# Patient Record
Sex: FEMALE | Race: ASIAN | NOT HISPANIC OR LATINO | URBAN - METROPOLITAN AREA
[De-identification: names, ages, dates, MRNs, and addresses within clinical notes are randomized per-mention and may not be internally consistent; named-entity substitution may affect disease eponyms.]

---

## 2020-04-01 ENCOUNTER — INPATIENT (INPATIENT)
Facility: HOSPITAL | Age: 30
LOS: 4 days | Discharge: HOME | End: 2020-04-06
Attending: INTERNAL MEDICINE | Admitting: INTERNAL MEDICINE
Payer: COMMERCIAL

## 2020-04-01 ENCOUNTER — TRANSCRIPTION ENCOUNTER (OUTPATIENT)
Age: 30
End: 2020-04-01

## 2020-04-01 VITALS
OXYGEN SATURATION: 96 % | TEMPERATURE: 98 F | SYSTOLIC BLOOD PRESSURE: 111 MMHG | RESPIRATION RATE: 18 BRPM | HEART RATE: 109 BPM | DIASTOLIC BLOOD PRESSURE: 72 MMHG

## 2020-04-01 DIAGNOSIS — J18.9 PNEUMONIA, UNSPECIFIED ORGANISM: ICD-10-CM

## 2020-04-01 DIAGNOSIS — E11.10 TYPE 2 DIABETES MELLITUS WITH KETOACIDOSIS WITHOUT COMA: ICD-10-CM

## 2020-04-01 DIAGNOSIS — R09.02 HYPOXEMIA: ICD-10-CM

## 2020-04-01 DIAGNOSIS — B34.2 CORONAVIRUS INFECTION, UNSPECIFIED: ICD-10-CM

## 2020-04-01 LAB
ALBUMIN SERPL ELPH-MCNC: 3.2 G/DL — LOW (ref 3.3–5)
ALP SERPL-CCNC: 61 U/L — SIGNIFICANT CHANGE UP (ref 40–120)
ALT FLD-CCNC: 8 U/L — SIGNIFICANT CHANGE UP (ref 4–33)
ANION GAP SERPL CALC-SCNC: 13 MMO/L — SIGNIFICANT CHANGE UP (ref 7–14)
ANION GAP SERPL CALC-SCNC: 16 MMO/L — HIGH (ref 7–14)
APTT BLD: 31.1 SEC — SIGNIFICANT CHANGE UP (ref 27.5–36.3)
AST SERPL-CCNC: 18 U/L — SIGNIFICANT CHANGE UP (ref 4–32)
B-OH-BUTYR SERPL-SCNC: 1 MMOL/L — HIGH (ref 0–0.4)
B-OH-BUTYR SERPL-SCNC: 3.1 MMOL/L — HIGH (ref 0–0.4)
BASE EXCESS BLDV CALC-SCNC: -0.4 MMOL/L — SIGNIFICANT CHANGE UP
BASE EXCESS BLDV CALC-SCNC: 3.4 MMOL/L — SIGNIFICANT CHANGE UP
BASOPHILS # BLD AUTO: 0.02 K/UL — SIGNIFICANT CHANGE UP (ref 0–0.2)
BASOPHILS NFR BLD AUTO: 0.2 % — SIGNIFICANT CHANGE UP (ref 0–2)
BASOPHILS NFR SPEC: 0 % — SIGNIFICANT CHANGE UP (ref 0–2)
BILIRUB SERPL-MCNC: 0.5 MG/DL — SIGNIFICANT CHANGE UP (ref 0.2–1.2)
BLASTS # FLD: 0 % — SIGNIFICANT CHANGE UP (ref 0–0)
BLOOD GAS VENOUS - CREATININE: 0.63 MG/DL — SIGNIFICANT CHANGE UP (ref 0.5–1.3)
BUN SERPL-MCNC: 11 MG/DL — SIGNIFICANT CHANGE UP (ref 7–23)
BUN SERPL-MCNC: 13 MG/DL — SIGNIFICANT CHANGE UP (ref 7–23)
CALCIUM SERPL-MCNC: 8.4 MG/DL — SIGNIFICANT CHANGE UP (ref 8.4–10.5)
CALCIUM SERPL-MCNC: 8.8 MG/DL — SIGNIFICANT CHANGE UP (ref 8.4–10.5)
CHLORIDE BLDV-SCNC: 98 MMOL/L — SIGNIFICANT CHANGE UP (ref 96–108)
CHLORIDE SERPL-SCNC: 92 MMOL/L — LOW (ref 98–107)
CHLORIDE SERPL-SCNC: 95 MMOL/L — LOW (ref 98–107)
CO2 SERPL-SCNC: 21 MMOL/L — LOW (ref 22–31)
CO2 SERPL-SCNC: 24 MMOL/L — SIGNIFICANT CHANGE UP (ref 22–31)
CREAT SERPL-MCNC: 0.55 MG/DL — SIGNIFICANT CHANGE UP (ref 0.5–1.3)
CREAT SERPL-MCNC: 0.64 MG/DL — SIGNIFICANT CHANGE UP (ref 0.5–1.3)
CRP SERPL-MCNC: 181.7 MG/L — HIGH
EOSINOPHIL # BLD AUTO: 0 K/UL — SIGNIFICANT CHANGE UP (ref 0–0.5)
EOSINOPHIL NFR BLD AUTO: 0 % — SIGNIFICANT CHANGE UP (ref 0–6)
EOSINOPHIL NFR FLD: 0 % — SIGNIFICANT CHANGE UP (ref 0–6)
GAS PNL BLDV: 131 MMOL/L — LOW (ref 136–146)
GAS PNL BLDV: 132 MMOL/L — LOW (ref 136–146)
GIANT PLATELETS BLD QL SMEAR: PRESENT — SIGNIFICANT CHANGE UP
GLUCOSE BLDV-MCNC: 235 MG/DL — HIGH (ref 70–99)
GLUCOSE BLDV-MCNC: 372 MG/DL — HIGH (ref 70–99)
GLUCOSE SERPL-MCNC: 234 MG/DL — HIGH (ref 70–99)
GLUCOSE SERPL-MCNC: 396 MG/DL — HIGH (ref 70–99)
HBA1C BLD-MCNC: 12.2 % — HIGH (ref 4–5.6)
HCG SERPL-ACNC: < 5 MIU/ML — SIGNIFICANT CHANGE UP
HCO3 BLDV-SCNC: 23 MMOL/L — SIGNIFICANT CHANGE UP (ref 20–27)
HCO3 BLDV-SCNC: 26 MMOL/L — SIGNIFICANT CHANGE UP (ref 20–27)
HCT VFR BLD CALC: 37.5 % — SIGNIFICANT CHANGE UP (ref 34.5–45)
HCT VFR BLDV CALC: 37.1 % — SIGNIFICANT CHANGE UP (ref 34.5–45)
HCT VFR BLDV CALC: 38.7 % — SIGNIFICANT CHANGE UP (ref 34.5–45)
HGB BLD-MCNC: 12.3 G/DL — SIGNIFICANT CHANGE UP (ref 11.5–15.5)
HGB BLDV-MCNC: 12 G/DL — SIGNIFICANT CHANGE UP (ref 11.5–15.5)
HGB BLDV-MCNC: 12.6 G/DL — SIGNIFICANT CHANGE UP (ref 11.5–15.5)
IMM GRANULOCYTES NFR BLD AUTO: 0.9 % — SIGNIFICANT CHANGE UP (ref 0–1.5)
INR BLD: 1.23 — HIGH (ref 0.88–1.17)
LACTATE BLDV-MCNC: 2.6 MMOL/L — HIGH (ref 0.5–2)
LIDOCAIN IGE QN: 16.5 U/L — SIGNIFICANT CHANGE UP (ref 7–60)
LYMPHOCYTES # BLD AUTO: 1.18 K/UL — SIGNIFICANT CHANGE UP (ref 1–3.3)
LYMPHOCYTES # BLD AUTO: 13.2 % — SIGNIFICANT CHANGE UP (ref 13–44)
LYMPHOCYTES NFR SPEC AUTO: 7.3 % — LOW (ref 13–44)
MACROCYTES BLD QL: SLIGHT — SIGNIFICANT CHANGE UP
MCHC RBC-ENTMCNC: 28.5 PG — SIGNIFICANT CHANGE UP (ref 27–34)
MCHC RBC-ENTMCNC: 32.8 % — SIGNIFICANT CHANGE UP (ref 32–36)
MCV RBC AUTO: 86.8 FL — SIGNIFICANT CHANGE UP (ref 80–100)
METAMYELOCYTES # FLD: 0.9 % — SIGNIFICANT CHANGE UP (ref 0–1)
MICROCYTES BLD QL: SLIGHT — SIGNIFICANT CHANGE UP
MONOCYTES # BLD AUTO: 0.49 K/UL — SIGNIFICANT CHANGE UP (ref 0–0.9)
MONOCYTES NFR BLD AUTO: 5.5 % — SIGNIFICANT CHANGE UP (ref 2–14)
MONOCYTES NFR BLD: 7.3 % — SIGNIFICANT CHANGE UP (ref 2–9)
MYELOCYTES NFR BLD: 0 % — SIGNIFICANT CHANGE UP (ref 0–0)
NEUTROPHIL AB SER-ACNC: 69.1 % — SIGNIFICANT CHANGE UP (ref 43–77)
NEUTROPHILS # BLD AUTO: 7.17 K/UL — SIGNIFICANT CHANGE UP (ref 1.8–7.4)
NEUTROPHILS NFR BLD AUTO: 80.2 % — HIGH (ref 43–77)
NEUTS BAND # BLD: 11.8 % — HIGH (ref 0–6)
NRBC # FLD: 0 K/UL — SIGNIFICANT CHANGE UP (ref 0–0)
OTHER - HEMATOLOGY %: 0 — SIGNIFICANT CHANGE UP
PCO2 BLDV: 47 MMHG — SIGNIFICANT CHANGE UP (ref 41–51)
PCO2 BLDV: 49 MMHG — SIGNIFICANT CHANGE UP (ref 41–51)
PH BLDV: 7.34 PH — SIGNIFICANT CHANGE UP (ref 7.32–7.43)
PH BLDV: 7.38 PH — SIGNIFICANT CHANGE UP (ref 7.32–7.43)
PLATELET # BLD AUTO: 193 K/UL — SIGNIFICANT CHANGE UP (ref 150–400)
PLATELET COUNT - ESTIMATE: NORMAL — SIGNIFICANT CHANGE UP
PMV BLD: 11.4 FL — SIGNIFICANT CHANGE UP (ref 7–13)
PO2 BLDV: 25 MMHG — LOW (ref 35–40)
PO2 BLDV: 26 MMHG — LOW (ref 35–40)
POLYCHROMASIA BLD QL SMEAR: SLIGHT — SIGNIFICANT CHANGE UP
POTASSIUM BLDV-SCNC: 3.5 MMOL/L — SIGNIFICANT CHANGE UP (ref 3.4–4.5)
POTASSIUM BLDV-SCNC: 4.3 MMOL/L — SIGNIFICANT CHANGE UP (ref 3.4–4.5)
POTASSIUM SERPL-MCNC: 3.6 MMOL/L — SIGNIFICANT CHANGE UP (ref 3.5–5.3)
POTASSIUM SERPL-MCNC: 4.2 MMOL/L — SIGNIFICANT CHANGE UP (ref 3.5–5.3)
POTASSIUM SERPL-SCNC: 3.6 MMOL/L — SIGNIFICANT CHANGE UP (ref 3.5–5.3)
POTASSIUM SERPL-SCNC: 4.2 MMOL/L — SIGNIFICANT CHANGE UP (ref 3.5–5.3)
PROMYELOCYTES # FLD: 0 % — SIGNIFICANT CHANGE UP (ref 0–0)
PROT SERPL-MCNC: 7.6 G/DL — SIGNIFICANT CHANGE UP (ref 6–8.3)
PROTHROM AB SERPL-ACNC: 14.1 SEC — HIGH (ref 9.8–13.1)
RBC # BLD: 4.32 M/UL — SIGNIFICANT CHANGE UP (ref 3.8–5.2)
RBC # FLD: 11.4 % — SIGNIFICANT CHANGE UP (ref 10.3–14.5)
REVIEW TO FOLLOW: YES — SIGNIFICANT CHANGE UP
SAO2 % BLDV: 41.4 % — LOW (ref 60–85)
SAO2 % BLDV: 43.3 % — LOW (ref 60–85)
SARS-COV-2 RNA SPEC QL NAA+PROBE: DETECTED
SODIUM SERPL-SCNC: 129 MMOL/L — LOW (ref 135–145)
SODIUM SERPL-SCNC: 132 MMOL/L — LOW (ref 135–145)
VARIANT LYMPHS # BLD: 3.6 % — SIGNIFICANT CHANGE UP
WBC # BLD: 8.94 K/UL — SIGNIFICANT CHANGE UP (ref 3.8–10.5)
WBC # FLD AUTO: 8.94 K/UL — SIGNIFICANT CHANGE UP (ref 3.8–10.5)

## 2020-04-01 PROCEDURE — 71045 X-RAY EXAM CHEST 1 VIEW: CPT | Mod: 26

## 2020-04-01 RX ORDER — DEXTROSE 50 % IN WATER 50 %
25 SYRINGE (ML) INTRAVENOUS ONCE
Refills: 0 | Status: DISCONTINUED | OUTPATIENT
Start: 2020-04-01 | End: 2020-04-06

## 2020-04-01 RX ORDER — SODIUM CHLORIDE 9 MG/ML
1000 INJECTION INTRAMUSCULAR; INTRAVENOUS; SUBCUTANEOUS ONCE
Refills: 0 | Status: COMPLETED | OUTPATIENT
Start: 2020-04-01 | End: 2020-04-01

## 2020-04-01 RX ORDER — GLUCAGON INJECTION, SOLUTION 0.5 MG/.1ML
1 INJECTION, SOLUTION SUBCUTANEOUS ONCE
Refills: 0 | Status: DISCONTINUED | OUTPATIENT
Start: 2020-04-01 | End: 2020-04-06

## 2020-04-01 RX ORDER — INSULIN LISPRO 100/ML
VIAL (ML) SUBCUTANEOUS
Refills: 0 | Status: DISCONTINUED | OUTPATIENT
Start: 2020-04-01 | End: 2020-04-02

## 2020-04-01 RX ORDER — DEXTROSE 50 % IN WATER 50 %
15 SYRINGE (ML) INTRAVENOUS ONCE
Refills: 0 | Status: DISCONTINUED | OUTPATIENT
Start: 2020-04-01 | End: 2020-04-06

## 2020-04-01 RX ORDER — ACETAMINOPHEN 500 MG
650 TABLET ORAL EVERY 4 HOURS
Refills: 0 | Status: DISCONTINUED | OUTPATIENT
Start: 2020-04-01 | End: 2020-04-02

## 2020-04-01 RX ORDER — HEPARIN SODIUM 5000 [USP'U]/ML
5000 INJECTION INTRAVENOUS; SUBCUTANEOUS EVERY 8 HOURS
Refills: 0 | Status: DISCONTINUED | OUTPATIENT
Start: 2020-04-01 | End: 2020-04-06

## 2020-04-01 RX ORDER — ONDANSETRON 8 MG/1
4 TABLET, FILM COATED ORAL EVERY 6 HOURS
Refills: 0 | Status: DISCONTINUED | OUTPATIENT
Start: 2020-04-01 | End: 2020-04-06

## 2020-04-01 RX ORDER — SODIUM CHLORIDE 9 MG/ML
1000 INJECTION, SOLUTION INTRAVENOUS
Refills: 0 | Status: DISCONTINUED | OUTPATIENT
Start: 2020-04-01 | End: 2020-04-01

## 2020-04-01 RX ORDER — ACETAMINOPHEN 500 MG
650 TABLET ORAL ONCE
Refills: 0 | Status: COMPLETED | OUTPATIENT
Start: 2020-04-01 | End: 2020-04-01

## 2020-04-01 RX ORDER — INSULIN LISPRO 100/ML
8 VIAL (ML) SUBCUTANEOUS ONCE
Refills: 0 | Status: COMPLETED | OUTPATIENT
Start: 2020-04-01 | End: 2020-04-01

## 2020-04-01 RX ORDER — INSULIN GLARGINE 100 [IU]/ML
20 INJECTION, SOLUTION SUBCUTANEOUS AT BEDTIME
Refills: 0 | Status: DISCONTINUED | OUTPATIENT
Start: 2020-04-01 | End: 2020-04-06

## 2020-04-01 RX ORDER — DEXTROSE 10 % IN WATER 10 %
1000 INTRAVENOUS SOLUTION INTRAVENOUS
Refills: 0 | Status: DISCONTINUED | OUTPATIENT
Start: 2020-04-01 | End: 2020-04-01

## 2020-04-01 RX ORDER — SODIUM CHLORIDE 9 MG/ML
1000 INJECTION, SOLUTION INTRAVENOUS
Refills: 0 | Status: DISCONTINUED | OUTPATIENT
Start: 2020-04-01 | End: 2020-04-06

## 2020-04-01 RX ORDER — INSULIN LISPRO 100/ML
VIAL (ML) SUBCUTANEOUS AT BEDTIME
Refills: 0 | Status: DISCONTINUED | OUTPATIENT
Start: 2020-04-01 | End: 2020-04-02

## 2020-04-01 RX ORDER — ONDANSETRON 8 MG/1
4 TABLET, FILM COATED ORAL ONCE
Refills: 0 | Status: COMPLETED | OUTPATIENT
Start: 2020-04-01 | End: 2020-04-01

## 2020-04-01 RX ORDER — DEXTROSE 50 % IN WATER 50 %
12.5 SYRINGE (ML) INTRAVENOUS ONCE
Refills: 0 | Status: DISCONTINUED | OUTPATIENT
Start: 2020-04-01 | End: 2020-04-06

## 2020-04-01 RX ADMIN — Medication 650 MILLIGRAM(S): at 12:55

## 2020-04-01 RX ADMIN — Medication 100 MILLILITER(S): at 17:12

## 2020-04-01 RX ADMIN — SODIUM CHLORIDE 1000 MILLILITER(S): 9 INJECTION INTRAMUSCULAR; INTRAVENOUS; SUBCUTANEOUS at 17:00

## 2020-04-01 RX ADMIN — SODIUM CHLORIDE 1000 MILLILITER(S): 9 INJECTION INTRAMUSCULAR; INTRAVENOUS; SUBCUTANEOUS at 12:55

## 2020-04-01 RX ADMIN — ONDANSETRON 4 MILLIGRAM(S): 8 TABLET, FILM COATED ORAL at 12:55

## 2020-04-01 NOTE — H&P ADULT - HISTORY OF PRESENT ILLNESS
29 F, DM (diet-controlled), 6 days poor PO, V x 4, SOB, dry cough, F. Seen at urgent care today; hypoxic, CXR w/ PNA. Failing Tylenol.    	Here, RA SaO2 87%

## 2020-04-01 NOTE — ED PROVIDER NOTE - ATTENDING CONTRIBUTION TO CARE
I performed a face-to-face evaluation of the patient and performed a history and physical examination. I agree with the history and physical examination.    Dale: Likely COVID PNA (cough, F, SOB, hypoxia). Labs, CXR, O2, admit.

## 2020-04-01 NOTE — ED ADULT NURSE NOTE - NSIMPLEMENTINTERV_GEN_ALL_ED
Implemented All Universal Safety Interventions:  [place] to call system. Call bell, personal items and telephone within reach. Instruct patient to call for assistance. Room bathroom lighting operational. Non-slip footwear when patient is off stretcher. Physically safe environment: no spills, clutter or unnecessary equipment. Stretcher in lowest position, wheels locked, appropriate side rails in place.

## 2020-04-01 NOTE — ED PROVIDER NOTE - OBJECTIVE STATEMENT
Dale: 29 F, DM (diet-controlled), 6 days poor PO, V x 4, SOB, dry cough, F. Seen at urgent care today; hypoxic, CXR w/ PNA. Failing Tylenol. Dale: 29 F, DM (diet-controlled), 6 days poor PO, V x 4, SOB, dry cough, F. Seen at urgent care today; hypoxic, CXR w/ PNA. Failing Tylenol.    Here, RA SaO2 87%.

## 2020-04-01 NOTE — ED ADULT NURSE REASSESSMENT NOTE - NS ED NURSE REASSESS COMMENT FT1
Pt received from day RN. Pt laying in stretcher comfortably. Pt not in any apparent distress. Vitals as noted. Respirations even and unlabored. Comfort measures provided. Will continue to monitor.

## 2020-04-01 NOTE — ED PROVIDER NOTE - PHYSICAL EXAMINATION
Ill-appearing, well nourished, awake, alert, oriented to person, place, time/situation and in no apparent distress.    Airway patent    Eyes without scleral injection. No jaundice.    Strong pulse.    Respirations unlabored. Decreased BS (B) lower lungs.    Abdomen soft, non-tender, no guarding.    Spine appears normal, range of motion is not limited, no muscle or joint tenderness.  Alert and oriented, no gross motor or sensory deficits.    Skin normal color for race, warm, dry and intact. No evidence of rash.    No SI/HI.

## 2020-04-01 NOTE — H&P ADULT - PROBLEM SELECTOR PLAN 1
acute hypoxic respiratory failure 2/2 covid.  Chest xray reveals b/l parenchymal infiltrates    -supportive care, tylenol prn   -Currently saturating % on 2L NC. Advised nurse to wean as tolerated   -EKG, consider hydroxychloroquine if clinical status worsens

## 2020-04-01 NOTE — H&P ADULT - NSHPPHYSICALEXAM_GEN_ALL_CORE
Ill-appearing, well nourished, awake, alert, oriented to person, place, time/situation and in no apparent distress.    	Airway patent    	Eyes without scleral injection. No jaundice.    	Strong pulse.    	Respirations unlabored. Decreased BS (B) lower lungs.    	Abdomen soft, non-tender, no guarding.    	Spine appears normal, range of motion is not limited, no muscle or joint tenderness.  	Alert and oriented, no gross motor or sensory deficits.    	Skin normal color for race, warm, dry and intact. No evidence of rash.

## 2020-04-01 NOTE — ED ADULT NURSE REASSESSMENT NOTE - NS ED NURSE REASSESS COMMENT FT1
At 11:40am, Pt was ordered to receive 8units of Lispro ( humalog) sq.  RN- Bailee Kurtz gave 200 units of Lispro IVP, upon recognition, IV saline lock was immediately removed, Jose A Solis, primary RN and Dr. Hunter notified. Pt is A & O, skin warm and dry to touch, initially exhibiting no s/s of hypoglycemia. Pt. is monitored hourly with finger sticks. FS- 303, 173, 103 and last FS 63. IV Dextrose 10% @ 100cc/hr in progress. Pt currently in spot 1b, so that she can be monitored closely. No Acute distress.- KALIA Saldana RN

## 2020-04-01 NOTE — ED ADULT NURSE NOTE - OBJECTIVE STATEMENT
~20:15 report received from USMAN Naranjo. Pt AxOx4, c/o fevers, nausea, vomiting x 1week. Respirations even and unlabored. Pt in no apparent distress. 20 gauge IV observed to left antecubital. Vital signs as noted. Will continue to monitor.

## 2020-04-01 NOTE — ED PROVIDER NOTE - NS ED ATTENDING STATEMENT MOD
I have personally seen and examined this patient.  I have fully participated in the care of this patient. I have reviewed all pertinent clinical information, including history, physical exam, plan and the Resident’s note and agree except as noted. I have personally performed a face to face diagnostic evaluation on this patient. I have reviewed the ACP note and agree with the history, exam and plan of care, except as noted. 0 = understands/communicates without difficulty

## 2020-04-01 NOTE — H&P ADULT - ASSESSMENT
29 F, DM (diet-controlled), 6 days poor PO, V x 4, SOB, dry cough, F. covid + and evidence of parenchymal infiltrates on xray.

## 2020-04-01 NOTE — H&P ADULT - NSHPLABSRESULTS_GEN_ALL_CORE
12.3   8.94  )-----------( 193      ( 01 Apr 2020 12:05 )             37.5       04-01    129<L>  |  92<L>  |  13  ----------------------------<  396<H>  4.2   |  21<L>  |  0.64    Ca    8.8      01 Apr 2020 12:05    TPro  7.6  /  Alb  3.2<L>  /  TBili  0.5  /  DBili  x   /  AST  18  /  ALT  8   /  AlkPhos  61  04-01                  PT/INR - ( 01 Apr 2020 12:05 )   PT: 14.1 SEC;   INR: 1.23          PTT - ( 01 Apr 2020 12:05 )  PTT:31.1 SEC    Lactate Trend            CAPILLARY BLOOD GLUCOSE      POCT Blood Glucose.: 104 mg/dL (01 Apr 2020 18:03)      Cultures:    Radiology: < from: Xray Chest 1 View AP/PA (04.01.20 @ 12:56) >    EXAM:  XR CHEST AP OR PA 1V        PROCEDURE DATE:  Apr 1 2020         INTERPRETATION:  Procedure : Single AP view of the chest was obtained.   Clinical Information : Cough and fever.  Comparison Exam : None.    FINDINGS:    The heart and the mediastinal silhouettes are unremarkable.   There is no evidence of pleural effusion. Moderate parenchymal infiltrates involving both lung bases and left perihilar region.  The bony structures are unremarkable.      IMPRESSION:    Moderate bilateral parenchymal infiltrates.    < end of copied text >        EKG:

## 2020-04-01 NOTE — ED ADULT TRIAGE NOTE - CHIEF COMPLAINT QUOTE
Patient brought to ER by EMS for c/o fever, cough, nausea and vomiting for a week. Also is a  and has Contact with positive COVID patients. Pt is diabetic and her fingerstick is 408. (Controlled by Diet)

## 2020-04-01 NOTE — H&P ADULT - PROBLEM SELECTOR PLAN 3
admitted with mild dka with glucose 396 and BHOB +. Starvation ketosis may have contributed as well.   look at ED provider note for further detail. Nurse gave unknown amount of humalog instead of zofran. Now glucose is dropping requiring d10 gtt   -continue d10 gtt until glucose stabilizes, humalog should last up to 6 hours. d/c d10 gtt if glucose >200  -stat bmp, vbg, and bhob   -lantus 20 units qhs , humalog should wear off by then   -humalog sliding scale   -hgb a1c     Due to [facility] policy during the evolving novel coronavirus outbreak, efforts are being made to limit unnecessary patient contacts to limit the spread of disease and preserve limited PPE. H&P along with assessment and plan is completed with the HPI, ROS, and PE of the emergency department.

## 2020-04-01 NOTE — H&P ADULT - NSHPREVIEWOFSYSTEMS_GEN_ALL_CORE
· ROS STATEMENT: all other ROS negative except as per HPI  · Constitutional [+]: FEVER  · CONSTITUTIONAL: - - -

## 2020-04-01 NOTE — ED PROVIDER NOTE - PROGRESS NOTE DETAILS
RN team administered unknown amount of humalog, RN Bailee thought she was giving Zofran 2mg but actually administered 2mg of Humalog which could have been up to 200 units, RN realized/stopped giving meds as she was pushing them so the patient received an unknown amount. In any event the patient has been hemodynamically stable, see chart for POCT FS's which have been recorded every half hour, infusion of D10 initiated. Hospitalist aware of this, pt admitted for covid symtpoms, mild DKA and hypoxia -sats 95% on 2L NC.

## 2020-04-02 DIAGNOSIS — E11.65 TYPE 2 DIABETES MELLITUS WITH HYPERGLYCEMIA: ICD-10-CM

## 2020-04-02 LAB
ALBUMIN SERPL ELPH-MCNC: 2.6 G/DL — LOW (ref 3.3–5)
ALP SERPL-CCNC: 56 U/L — SIGNIFICANT CHANGE UP (ref 40–120)
ALT FLD-CCNC: 5 U/L — SIGNIFICANT CHANGE UP (ref 4–33)
ANION GAP SERPL CALC-SCNC: 11 MMO/L — SIGNIFICANT CHANGE UP (ref 7–14)
AST SERPL-CCNC: 25 U/L — SIGNIFICANT CHANGE UP (ref 4–32)
BASOPHILS # BLD AUTO: 0.02 K/UL — SIGNIFICANT CHANGE UP (ref 0–0.2)
BASOPHILS NFR BLD AUTO: 0.2 % — SIGNIFICANT CHANGE UP (ref 0–2)
BILIRUB SERPL-MCNC: 0.4 MG/DL — SIGNIFICANT CHANGE UP (ref 0.2–1.2)
BUN SERPL-MCNC: 9 MG/DL — SIGNIFICANT CHANGE UP (ref 7–23)
CALCIUM SERPL-MCNC: 8.5 MG/DL — SIGNIFICANT CHANGE UP (ref 8.4–10.5)
CHLORIDE SERPL-SCNC: 95 MMOL/L — LOW (ref 98–107)
CO2 SERPL-SCNC: 23 MMOL/L — SIGNIFICANT CHANGE UP (ref 22–31)
CREAT SERPL-MCNC: 0.49 MG/DL — LOW (ref 0.5–1.3)
EOSINOPHIL # BLD AUTO: 0 K/UL — SIGNIFICANT CHANGE UP (ref 0–0.5)
EOSINOPHIL NFR BLD AUTO: 0 % — SIGNIFICANT CHANGE UP (ref 0–6)
GLUCOSE BLDC GLUCOMTR-MCNC: 162 MG/DL — HIGH (ref 70–99)
GLUCOSE BLDC GLUCOMTR-MCNC: 209 MG/DL — HIGH (ref 70–99)
GLUCOSE SERPL-MCNC: 235 MG/DL — HIGH (ref 70–99)
HCT VFR BLD CALC: 34.9 % — SIGNIFICANT CHANGE UP (ref 34.5–45)
HGB BLD-MCNC: 11.5 G/DL — SIGNIFICANT CHANGE UP (ref 11.5–15.5)
IMM GRANULOCYTES NFR BLD AUTO: 0.7 % — SIGNIFICANT CHANGE UP (ref 0–1.5)
LYMPHOCYTES # BLD AUTO: 1.81 K/UL — SIGNIFICANT CHANGE UP (ref 1–3.3)
LYMPHOCYTES # BLD AUTO: 17.9 % — SIGNIFICANT CHANGE UP (ref 13–44)
MCHC RBC-ENTMCNC: 28.3 PG — SIGNIFICANT CHANGE UP (ref 27–34)
MCHC RBC-ENTMCNC: 33 % — SIGNIFICANT CHANGE UP (ref 32–36)
MCV RBC AUTO: 85.7 FL — SIGNIFICANT CHANGE UP (ref 80–100)
MONOCYTES # BLD AUTO: 0.42 K/UL — SIGNIFICANT CHANGE UP (ref 0–0.9)
MONOCYTES NFR BLD AUTO: 4.1 % — SIGNIFICANT CHANGE UP (ref 2–14)
NEUTROPHILS # BLD AUTO: 7.82 K/UL — HIGH (ref 1.8–7.4)
NEUTROPHILS NFR BLD AUTO: 77.1 % — HIGH (ref 43–77)
NRBC # FLD: 0 K/UL — SIGNIFICANT CHANGE UP (ref 0–0)
PLATELET # BLD AUTO: 217 K/UL — SIGNIFICANT CHANGE UP (ref 150–400)
PMV BLD: 11 FL — SIGNIFICANT CHANGE UP (ref 7–13)
POTASSIUM SERPL-MCNC: 4 MMOL/L — SIGNIFICANT CHANGE UP (ref 3.5–5.3)
POTASSIUM SERPL-SCNC: 4 MMOL/L — SIGNIFICANT CHANGE UP (ref 3.5–5.3)
PROT SERPL-MCNC: 7.2 G/DL — SIGNIFICANT CHANGE UP (ref 6–8.3)
RBC # BLD: 4.07 M/UL — SIGNIFICANT CHANGE UP (ref 3.8–5.2)
RBC # FLD: 11.6 % — SIGNIFICANT CHANGE UP (ref 10.3–14.5)
SODIUM SERPL-SCNC: 129 MMOL/L — LOW (ref 135–145)
WBC # BLD: 10.14 K/UL — SIGNIFICANT CHANGE UP (ref 3.8–10.5)
WBC # FLD AUTO: 10.14 K/UL — SIGNIFICANT CHANGE UP (ref 3.8–10.5)

## 2020-04-02 PROCEDURE — 99251: CPT

## 2020-04-02 PROCEDURE — 99223 1ST HOSP IP/OBS HIGH 75: CPT

## 2020-04-02 RX ORDER — INSULIN LISPRO 100/ML
VIAL (ML) SUBCUTANEOUS
Refills: 0 | Status: DISCONTINUED | OUTPATIENT
Start: 2020-04-02 | End: 2020-04-06

## 2020-04-02 RX ORDER — ACETAMINOPHEN 500 MG
650 TABLET ORAL EVERY 6 HOURS
Refills: 0 | Status: DISCONTINUED | OUTPATIENT
Start: 2020-04-02 | End: 2020-04-06

## 2020-04-02 RX ORDER — INSULIN LISPRO 100/ML
VIAL (ML) SUBCUTANEOUS AT BEDTIME
Refills: 0 | Status: DISCONTINUED | OUTPATIENT
Start: 2020-04-02 | End: 2020-04-06

## 2020-04-02 RX ORDER — INSULIN LISPRO 100/ML
6 VIAL (ML) SUBCUTANEOUS
Refills: 0 | Status: DISCONTINUED | OUTPATIENT
Start: 2020-04-02 | End: 2020-04-04

## 2020-04-02 RX ADMIN — HEPARIN SODIUM 5000 UNIT(S): 5000 INJECTION INTRAVENOUS; SUBCUTANEOUS at 14:00

## 2020-04-02 RX ADMIN — INSULIN GLARGINE 20 UNIT(S): 100 INJECTION, SOLUTION SUBCUTANEOUS at 22:38

## 2020-04-02 RX ADMIN — Medication 6 UNIT(S): at 17:26

## 2020-04-02 RX ADMIN — HEPARIN SODIUM 5000 UNIT(S): 5000 INJECTION INTRAVENOUS; SUBCUTANEOUS at 04:32

## 2020-04-02 RX ADMIN — ONDANSETRON 4 MILLIGRAM(S): 8 TABLET, FILM COATED ORAL at 00:04

## 2020-04-02 RX ADMIN — Medication 650 MILLIGRAM(S): at 17:35

## 2020-04-02 RX ADMIN — INSULIN GLARGINE 20 UNIT(S): 100 INJECTION, SOLUTION SUBCUTANEOUS at 00:01

## 2020-04-02 RX ADMIN — Medication 4: at 10:25

## 2020-04-02 RX ADMIN — HEPARIN SODIUM 5000 UNIT(S): 5000 INJECTION INTRAVENOUS; SUBCUTANEOUS at 22:38

## 2020-04-02 RX ADMIN — Medication 2: at 17:25

## 2020-04-02 RX ADMIN — Medication 4: at 12:12

## 2020-04-02 NOTE — CONSULT NOTE ADULT - SUBJECTIVE AND OBJECTIVE BOX
Cardiology/Hospitalist Attending Inpatient Note    Date of Admission: 4/1/20      HISTORY OF PRESENT ILLNESS:  Patient is a 30yo woman with DM (reportedly diet-controlled but HgbA1C markedly elevated), 6 days poor PO, Vomiting x 4 days, SOB, dry cough, +fever.   Seen at urgent care yesterday, and was noted to be hypoxic, CXR w/ PNA. Fevers were not relieved with Tylenol.    COVID (+) on PCR.    Hemodynamically stable at the time of my exam.  She is 91-95% O2 sat on 2 L NC.  Not currently on antibiotics or antiviral agents at this time.      Allergies  Allergy Status Unknown    MEDICATIONS:  heparin  Injectable 5000 Unit(s) SubCutaneous every 8 hours  acetaminophen   Tablet .. 650 milliGRAM(s) Oral every 4 hours PRN  ondansetron Injectable 4 milliGRAM(s) IV Push every 6 hours PRN  dextrose 40% Gel 15 Gram(s) Oral once PRN  dextrose 50% Injectable 12.5 Gram(s) IV Push once  dextrose 50% Injectable 25 Gram(s) IV Push once  dextrose 50% Injectable 25 Gram(s) IV Push once  glucagon  Injectable 1 milliGRAM(s) IntraMuscular once PRN  insulin glargine Injectable (LANTUS) 20 Unit(s) SubCutaneous at bedtime  insulin lispro (HumaLOG) corrective regimen sliding scale   SubCutaneous three times a day before meals  insulin lispro (HumaLOG) corrective regimen sliding scale   SubCutaneous at bedtime  dextrose 5%. 1000 milliLiter(s) IV Continuous <Continuous>    PAST MEDICAL & SURGICAL HISTORY:  T2DM (type 2 diabetes mellitus)    FAMILY HISTORY:  No pertinent family history in first degree relatives    SOCIAL HISTORY:    NC    REVIEW OF SYSTEMS:  As above    PHYSICAL EXAM:  T(C): 37.1 (04-02-20 @ 10:22), Max: 37.6 (04-02-20 @ 06:10)  HR: 109 (04-02-20 @ 10:22) (91 - 115)  BP: 131/79 (04-02-20 @ 10:22) (111/71 - 131/79)  RR: 20 (04-02-20 @ 10:22) (17 - 20)  SpO2: 91% (04-02-20 @ 10:22) (91% - 100%)    Appearance: +dyspneic  HEENT:   Normal oral mucosa, PERRL, EOMI	  Cardiovascular: Normal S1 S2, No JVD, No murmurs, No edema  Respiratory: Coarse breath sounds bilaterally  Psychiatry: Awake, alert  Gastrointestinal:  Soft, Non-tender, + BS	  Skin: No rashes, No ecchymoses, No cyanosis	  Neurologic: Non-focal  Extremities: Normal range of motion, No clubbing, cyanosis or edema  Vascular: Peripheral pulses palpable 2+ bilaterally      LABS:	 	                          11.5   10.14 )-----------( 217      ( 02 Apr 2020 06:00 )             34.9     04-02    129<L>  |  95<L>  |  9   ----------------------------<  235<H>  4.0   |  23  |  0.49<L>  04-01    132<L>  |  95<L>  |  11  ----------------------------<  234<H>  3.6   |  24  |  0.55    Ca    8.5      02 Apr 2020 06:00  Ca    8.4      01 Apr 2020 21:39    TPro  7.2  /  Alb  2.6<L>  /  TBili  0.4  /  DBili  x   /  AST  25  /  ALT  5   /  AlkPhos  56  04-02  TPro  7.6  /  Alb  3.2<L>  /  TBili  0.5  /  DBili  x   /  AST  18  /  ALT  8   /  AlkPhos  61  04-01    < from: Xray Chest 1 View AP/PA (04.01.20 @ 12:56) >    EXAM:  XR CHEST AP OR PA 1V        PROCEDURE DATE:  Apr 1 2020         INTERPRETATION:  Procedure : Single AP view of the chest was obtained.   Clinical Information : Cough and fever.  Comparison Exam : None.    FINDINGS:    The heart and the mediastinal silhouettes are unremarkable.   There is no evidence of pleural effusion. Moderate parenchymal infiltrates involving both lung bases and left perihilar region.  The bony structures are unremarkable.      IMPRESSION:    Moderate bilateral parenchymal infiltrates.      ELODIA MANCILLA M.D., ATTENDING RADIOLOGIST  This document has been electronically signed. Apr 1 2020  1:04PM Cardiology/Hospitalist Attending Inpatient Note    Date of Admission: 4/1/20    HISTORY OF PRESENT ILLNESS:  Patient is a 28yo woman with DM (reportedly diet-controlled but HgbA1C markedly elevated), 6 days poor PO, Vomiting x 4 days, SOB, dry cough, +fever.   Seen at urgent care yesterday, and was noted to be hypoxic, CXR w/ PNA. Fevers were not relieved with Tylenol.    COVID (+) on PCR.    Hemodynamically stable at the time of my exam.  She is 91-95% O2 sat on 2 L NC.  Not currently on antibiotics or antiviral agents at this time.  Spoke with ID (Antonietta Minnie) -- agree hydoxychloroquine can be started given respiratory distress.  Patient otherwise had no additional active complaints except for generalized fatigue and myalgias.    Allergies  Allergy Status Unknown    MEDICATIONS:  heparin  Injectable 5000 Unit(s) SubCutaneous every 8 hours  acetaminophen   Tablet .. 650 milliGRAM(s) Oral every 4 hours PRN  ondansetron Injectable 4 milliGRAM(s) IV Push every 6 hours PRN  dextrose 40% Gel 15 Gram(s) Oral once PRN  dextrose 50% Injectable 12.5 Gram(s) IV Push once  dextrose 50% Injectable 25 Gram(s) IV Push once  dextrose 50% Injectable 25 Gram(s) IV Push once  glucagon  Injectable 1 milliGRAM(s) IntraMuscular once PRN  insulin glargine Injectable (LANTUS) 20 Unit(s) SubCutaneous at bedtime  insulin lispro (HumaLOG) corrective regimen sliding scale   SubCutaneous three times a day before meals  insulin lispro (HumaLOG) corrective regimen sliding scale   SubCutaneous at bedtime  dextrose 5%. 1000 milliLiter(s) IV Continuous <Continuous>    PAST MEDICAL & SURGICAL HISTORY:  T2DM (type 2 diabetes mellitus)    FAMILY HISTORY:  No pertinent family history in first degree relatives    SOCIAL HISTORY:    NC    REVIEW OF SYSTEMS:  As above    PHYSICAL EXAM:  T(C): 37.1 (04-02-20 @ 10:22), Max: 37.6 (04-02-20 @ 06:10)  HR: 109 (04-02-20 @ 10:22) (91 - 115)  BP: 131/79 (04-02-20 @ 10:22) (111/71 - 131/79)  RR: 20 (04-02-20 @ 10:22) (17 - 20)  SpO2: 91% (04-02-20 @ 10:22) (91% - 100%)    Appearance: +dyspneic  HEENT:   Normal oral mucosa, PERRL, EOMI	  Cardiovascular: Normal S1 S2, No JVD, No murmurs, No edema  Respiratory: Coarse breath sounds bilaterally  Psychiatry: Awake, alert  Gastrointestinal:  Soft, Non-tender, + BS	  Skin: No rashes, No ecchymoses, No cyanosis	  Neurologic: Non-focal  Extremities: Normal range of motion, No clubbing, cyanosis or edema  Vascular: Peripheral pulses palpable 2+ bilaterally      LABS:	 	                          11.5   10.14 )-----------( 217      ( 02 Apr 2020 06:00 )             34.9     04-02    129<L>  |  95<L>  |  9   ----------------------------<  235<H>  4.0   |  23  |  0.49<L>  04-01    132<L>  |  95<L>  |  11  ----------------------------<  234<H>  3.6   |  24  |  0.55    Ca    8.5      02 Apr 2020 06:00  Ca    8.4      01 Apr 2020 21:39    TPro  7.2  /  Alb  2.6<L>  /  TBili  0.4  /  DBili  x   /  AST  25  /  ALT  5   /  AlkPhos  56  04-02  TPro  7.6  /  Alb  3.2<L>  /  TBili  0.5  /  DBili  x   /  AST  18  /  ALT  8   /  AlkPhos  61  04-01    < from: Xray Chest 1 View AP/PA (04.01.20 @ 12:56) >    EXAM:  XR CHEST AP OR PA 1V        PROCEDURE DATE:  Apr 1 2020         INTERPRETATION:  Procedure : Single AP view of the chest was obtained.   Clinical Information : Cough and fever.  Comparison Exam : None.    FINDINGS:    The heart and the mediastinal silhouettes are unremarkable.   There is no evidence of pleural effusion. Moderate parenchymal infiltrates involving both lung bases and left perihilar region.  The bony structures are unremarkable.      IMPRESSION:    Moderate bilateral parenchymal infiltrates.      ELODIA MANCILLA M.D., ATTENDING RADIOLOGIST  This document has been electronically signed. Apr 1 2020  1:04PM

## 2020-04-02 NOTE — CONSULT NOTE ADULT - ASSESSMENT
This is a 29 woman w/ DM a/w hypoxic resp failure 2/2 COVID-19. Endo consulted for uncontrolled DM2 w/ hyperglycemia.
Patient is a 30yo woman with DM (reportedly diet-controlled but HgbA1C markedly elevated), 6 days poor PO, Vomiting x 4 days, SOB, dry cough, +fever.   Seen at urgent care yesterday, and was noted to be hypoxic, CXR w/ PNA. Fevers were not relieved with Tylenol.    COVID (+) on PCR.    Hemodynamically stable at the time of my exam.  She is 91-95% O2 sat on 2 L NC.  Not currently on antibiotics or antiviral agents at this time.  Spoke with ID (Antonietta Hartman) -- agree hydroxychloroquine can be started given respiratory distress.  Patient otherwise had no additional active complaints except for generalized fatigue and myalgias.    Continue supportive management.

## 2020-04-02 NOTE — CONSULT NOTE ADULT - PROBLEM SELECTOR RECOMMENDATION 9
Hba1c 12.2, uncontrolled.   Wt 104 kg/ 230 lbs.  Agree w/ weight based dosing of insulin:  -Cont. Lantus 20u qhs  -Add Humalog 6-6-6  -Low dose SS TIDAC/qhs  -Carb consistent diet  -Monitor FS's TIDAC/qhs    D/C regimen: likely basal/bolus insulin, doses TBD. Can f/u in faculty endo practive at [address] [place] [address], [phone].

## 2020-04-02 NOTE — CONSULT NOTE ADULT - SUBJECTIVE AND OBJECTIVE BOX
HPI:  This is a 29 woman w/ DM (diet-controlled), 6 days poor PO, V x 4, SOB, dry cough, F. Seen at urgent care today; hypoxic, CXR w/ PNA. Failing Tylenol. Found to be COVID-19 +.    DM history: Dx in 2012. States initially she was on insulin. She was then switched to pills. The last time she was on medication was 2017. States she was on Januvia and Metformin at that time but it bothered her stomach. She has not been checking FS's. She has been working out and lost 20 lbs over the past year. States her diet is very healthy and she eats alot of fresh fruits and vegetables. She has not been having any sx of polyuria/polydipsia. No blurry vision. Reports optho exam w/in past year and no retinopathy. Has occ. sx of neuropathy and has been on neurontin in the past. FH of DM2 in her mother.      PAST MEDICAL & SURGICAL HISTORY:  T2DM (type 2 diabetes mellitus)      FAMILY HISTORY:  FH of DM2 in her mother.      Social History:  No toxic habits    Outpatient Medications:  None    MEDICATIONS  (STANDING):  dextrose 5%. 1000 milliLiter(s) (50 mL/Hr) IV Continuous <Continuous>  dextrose 50% Injectable 12.5 Gram(s) IV Push once  dextrose 50% Injectable 25 Gram(s) IV Push once  dextrose 50% Injectable 25 Gram(s) IV Push once  heparin  Injectable 5000 Unit(s) SubCutaneous every 8 hours  insulin glargine Injectable (LANTUS) 20 Unit(s) SubCutaneous at bedtime  insulin lispro (HumaLOG) corrective regimen sliding scale   SubCutaneous three times a day before meals  insulin lispro (HumaLOG) corrective regimen sliding scale   SubCutaneous at bedtime    MEDICATIONS  (PRN):  acetaminophen   Tablet .. 650 milliGRAM(s) Oral every 4 hours PRN Temp greater or equal to 38.5C (101.3F)  dextrose 40% Gel 15 Gram(s) Oral once PRN Blood Glucose LESS THAN 70 milliGRAM(s)/deciLiter  glucagon  Injectable 1 milliGRAM(s) IntraMuscular once PRN Glucose <70 milliGRAM(s)/deciLiter  ondansetron Injectable 4 milliGRAM(s) IV Push every 6 hours PRN Nausea and/or Vomiting      Allergies  Allergy Status Unknown          Review of Systems:  Constitutional: +weakness  Eyes: No blurry vision  Neuro: No tremors  HEENT: No pain  Cardiovascular: No chest pain, palpitations  Respiratory: + SOB, +cough  GI: No nausea, vomiting, abdominal pain  : No dysuria  Skin: no rash  Psych: no depression  Endocrine: no polyuria, polydipsia  Hem/lymph: no swelling  Osteoporosis: no fractures      PHYSICAL EXAM:  VITALS: T(C): 37.1 (04-02-20 @ 10:22)  T(F): 98.7 (04-02-20 @ 10:22), Max: 99.7 (04-02-20 @ 06:10)  HR: 109 (04-02-20 @ 10:22) (91 - 115)  BP: 131/79 (04-02-20 @ 10:22) (111/71 - 131/79)  RR:  (17 - 20)  SpO2:  (91% - 100%)  Wt(kg): --      POCT Blood Glucose.: 230 mg/dL (04-02-20 @ 11:56)  POCT Blood Glucose.: 239 mg/dL (04-02-20 @ 09:37)  POCT Blood Glucose.: 219 mg/dL (04-02-20 @ 00:59)  POCT Blood Glucose.: 190 mg/dL (04-01-20 @ 23:54)  POCT Blood Glucose.: 240 mg/dL (04-01-20 @ 21:38)  POCT Blood Glucose.: 172 mg/dL (04-01-20 @ 19:57)  POCT Blood Glucose.: 129 mg/dL (04-01-20 @ 18:47)  POCT Blood Glucose.: 104 mg/dL (04-01-20 @ 18:03)  POCT Blood Glucose.: 63 mg/dL (04-01-20 @ 16:41)  POCT Blood Glucose.: 103 mg/dL (04-01-20 @ 15:23)  POCT Blood Glucose.: 173 mg/dL (04-01-20 @ 14:36)  POCT Blood Glucose.: 303 mg/dL (04-01-20 @ 13:16)                            11.5   10.14 )-----------( 217      ( 02 Apr 2020 06:00 )             34.9       04-02    129<L>  |  95<L>  |  9   ----------------------------<  235<H>  4.0   |  23  |  0.49<L>    EGFR if : 153  EGFR if non : 132    Ca    8.5      04-02    TPro  7.2  /  Alb  2.6<L>  /  TBili  0.4  /  DBili  x   /  AST  25  /  ALT  5   /  AlkPhos  56  04-02      Thyroid Function Tests:      Hemoglobin A1C, Whole Blood: 12.2 % <H> [4.0 - 5.6] (04-01-20 @ 21:39)          Radiology:

## 2020-04-03 LAB
ALBUMIN SERPL ELPH-MCNC: 3 G/DL — LOW (ref 3.3–5)
ALP SERPL-CCNC: 55 U/L — SIGNIFICANT CHANGE UP (ref 40–120)
ALT FLD-CCNC: 5 U/L — SIGNIFICANT CHANGE UP (ref 4–33)
ANION GAP SERPL CALC-SCNC: 10 MMO/L — SIGNIFICANT CHANGE UP (ref 7–14)
ANISOCYTOSIS BLD QL: SLIGHT — SIGNIFICANT CHANGE UP
AST SERPL-CCNC: 25 U/L — SIGNIFICANT CHANGE UP (ref 4–32)
BASOPHILS # BLD AUTO: 0.03 K/UL — SIGNIFICANT CHANGE UP (ref 0–0.2)
BASOPHILS NFR BLD AUTO: 0.4 % — SIGNIFICANT CHANGE UP (ref 0–2)
BASOPHILS NFR SPEC: 0 % — SIGNIFICANT CHANGE UP (ref 0–2)
BILIRUB SERPL-MCNC: 0.4 MG/DL — SIGNIFICANT CHANGE UP (ref 0.2–1.2)
BLASTS # FLD: 0 % — SIGNIFICANT CHANGE UP (ref 0–0)
BUN SERPL-MCNC: 10 MG/DL — SIGNIFICANT CHANGE UP (ref 7–23)
CALCIUM SERPL-MCNC: 8.7 MG/DL — SIGNIFICANT CHANGE UP (ref 8.4–10.5)
CHLORIDE SERPL-SCNC: 95 MMOL/L — LOW (ref 98–107)
CO2 SERPL-SCNC: 27 MMOL/L — SIGNIFICANT CHANGE UP (ref 22–31)
CREAT SERPL-MCNC: 0.45 MG/DL — LOW (ref 0.5–1.3)
EOSINOPHIL # BLD AUTO: 0 K/UL — SIGNIFICANT CHANGE UP (ref 0–0.5)
EOSINOPHIL NFR BLD AUTO: 0 % — SIGNIFICANT CHANGE UP (ref 0–6)
EOSINOPHIL NFR FLD: 0 % — SIGNIFICANT CHANGE UP (ref 0–6)
GIANT PLATELETS BLD QL SMEAR: PRESENT — SIGNIFICANT CHANGE UP
GLUCOSE BLDC GLUCOMTR-MCNC: 127 MG/DL — HIGH (ref 70–99)
GLUCOSE BLDC GLUCOMTR-MCNC: 163 MG/DL — HIGH (ref 70–99)
GLUCOSE BLDC GLUCOMTR-MCNC: 97 MG/DL — SIGNIFICANT CHANGE UP (ref 70–99)
GLUCOSE BLDC GLUCOMTR-MCNC: 97 MG/DL — SIGNIFICANT CHANGE UP (ref 70–99)
GLUCOSE SERPL-MCNC: 149 MG/DL — HIGH (ref 70–99)
HCT VFR BLD CALC: 35.4 % — SIGNIFICANT CHANGE UP (ref 34.5–45)
HGB BLD-MCNC: 11.5 G/DL — SIGNIFICANT CHANGE UP (ref 11.5–15.5)
IMM GRANULOCYTES NFR BLD AUTO: 1 % — SIGNIFICANT CHANGE UP (ref 0–1.5)
LYMPHOCYTES # BLD AUTO: 2.54 K/UL — SIGNIFICANT CHANGE UP (ref 1–3.3)
LYMPHOCYTES # BLD AUTO: 32.8 % — SIGNIFICANT CHANGE UP (ref 13–44)
LYMPHOCYTES NFR SPEC AUTO: 23.3 % — SIGNIFICANT CHANGE UP (ref 13–44)
MACROCYTES BLD QL: SLIGHT — SIGNIFICANT CHANGE UP
MCHC RBC-ENTMCNC: 28.4 PG — SIGNIFICANT CHANGE UP (ref 27–34)
MCHC RBC-ENTMCNC: 32.5 % — SIGNIFICANT CHANGE UP (ref 32–36)
MCV RBC AUTO: 87.4 FL — SIGNIFICANT CHANGE UP (ref 80–100)
METAMYELOCYTES # FLD: 0 % — SIGNIFICANT CHANGE UP (ref 0–1)
MONOCYTES # BLD AUTO: 0.47 K/UL — SIGNIFICANT CHANGE UP (ref 0–0.9)
MONOCYTES NFR BLD AUTO: 6.1 % — SIGNIFICANT CHANGE UP (ref 2–14)
MONOCYTES NFR BLD: 2.6 % — SIGNIFICANT CHANGE UP (ref 2–9)
MYELOCYTES NFR BLD: 0 % — SIGNIFICANT CHANGE UP (ref 0–0)
NEUTROPHIL AB SER-ACNC: 68.9 % — SIGNIFICANT CHANGE UP (ref 43–77)
NEUTROPHILS # BLD AUTO: 4.62 K/UL — SIGNIFICANT CHANGE UP (ref 1.8–7.4)
NEUTROPHILS NFR BLD AUTO: 59.7 % — SIGNIFICANT CHANGE UP (ref 43–77)
NEUTS BAND # BLD: 0 % — SIGNIFICANT CHANGE UP (ref 0–6)
NRBC # FLD: 0 K/UL — SIGNIFICANT CHANGE UP (ref 0–0)
OTHER - HEMATOLOGY %: 0 — SIGNIFICANT CHANGE UP
PLATELET # BLD AUTO: 258 K/UL — SIGNIFICANT CHANGE UP (ref 150–400)
PLATELET COUNT - ESTIMATE: NORMAL — SIGNIFICANT CHANGE UP
PMV BLD: 11.2 FL — SIGNIFICANT CHANGE UP (ref 7–13)
POLYCHROMASIA BLD QL SMEAR: SLIGHT — SIGNIFICANT CHANGE UP
POTASSIUM SERPL-MCNC: 3.6 MMOL/L — SIGNIFICANT CHANGE UP (ref 3.5–5.3)
POTASSIUM SERPL-SCNC: 3.6 MMOL/L — SIGNIFICANT CHANGE UP (ref 3.5–5.3)
PROMYELOCYTES # FLD: 0 % — SIGNIFICANT CHANGE UP (ref 0–0)
PROT SERPL-MCNC: 7 G/DL — SIGNIFICANT CHANGE UP (ref 6–8.3)
RBC # BLD: 4.05 M/UL — SIGNIFICANT CHANGE UP (ref 3.8–5.2)
RBC # FLD: 11.7 % — SIGNIFICANT CHANGE UP (ref 10.3–14.5)
REVIEW TO FOLLOW: YES — SIGNIFICANT CHANGE UP
SMUDGE CELLS # BLD: PRESENT — SIGNIFICANT CHANGE UP
SODIUM SERPL-SCNC: 132 MMOL/L — LOW (ref 135–145)
VARIANT LYMPHS # BLD: 5.2 % — SIGNIFICANT CHANGE UP
WBC # BLD: 7.74 K/UL — SIGNIFICANT CHANGE UP (ref 3.8–10.5)
WBC # FLD AUTO: 7.74 K/UL — SIGNIFICANT CHANGE UP (ref 3.8–10.5)

## 2020-04-03 PROCEDURE — 99233 SBSQ HOSP IP/OBS HIGH 50: CPT

## 2020-04-03 RX ORDER — HYDROXYCHLOROQUINE SULFATE 200 MG
200 TABLET ORAL EVERY 12 HOURS
Refills: 0 | Status: DISCONTINUED | OUTPATIENT
Start: 2020-04-04 | End: 2020-04-06

## 2020-04-03 RX ORDER — HYDROXYCHLOROQUINE SULFATE 200 MG
TABLET ORAL
Refills: 0 | Status: DISCONTINUED | OUTPATIENT
Start: 2020-04-03 | End: 2020-04-06

## 2020-04-03 RX ORDER — HYDROXYCHLOROQUINE SULFATE 200 MG
400 TABLET ORAL EVERY 12 HOURS
Refills: 0 | Status: COMPLETED | OUTPATIENT
Start: 2020-04-03 | End: 2020-04-04

## 2020-04-03 RX ADMIN — Medication 650 MILLIGRAM(S): at 21:14

## 2020-04-03 RX ADMIN — Medication 6 UNIT(S): at 17:37

## 2020-04-03 RX ADMIN — Medication 6 UNIT(S): at 08:53

## 2020-04-03 RX ADMIN — Medication 6 UNIT(S): at 12:39

## 2020-04-03 RX ADMIN — Medication 1: at 08:53

## 2020-04-03 RX ADMIN — Medication 400 MILLIGRAM(S): at 17:37

## 2020-04-03 RX ADMIN — HEPARIN SODIUM 5000 UNIT(S): 5000 INJECTION INTRAVENOUS; SUBCUTANEOUS at 14:25

## 2020-04-03 RX ADMIN — HEPARIN SODIUM 5000 UNIT(S): 5000 INJECTION INTRAVENOUS; SUBCUTANEOUS at 05:29

## 2020-04-03 NOTE — PROGRESS NOTE ADULT - SUBJECTIVE AND OBJECTIVE BOX
Cardiology/Hospitalist Attending Inpatient Note      Vital Signs Last 24 Hrs  T(C): 37.8 (03 Apr 2020 04:33), Max: 37.9 (02 Apr 2020 16:37)  T(F): 100 (03 Apr 2020 04:33), Max: 100.2 (02 Apr 2020 16:37)  HR: 90 (03 Apr 2020 04:33) (90 - 110)  BP: 100/55 (03 Apr 2020 04:33) (90/51 - 131/79)  BP(mean): --  RR: 20 (03 Apr 2020 04:33) (19 - 20)  SpO2: 98% (03 Apr 2020 04:33) (91% - 98%)    I&O's Detail  None recorded    Appearance: +dyspneic  HEENT:   Normal oral mucosa, PERRL, EOMI	  Cardiovascular: Normal S1 S2, No JVD, No murmurs, No edema  Respiratory: Coarse breath sounds bilaterally  Psychiatry: Awake, alert  Gastrointestinal:  Soft, Non-tender, + BS	  Skin: No rashes, No ecchymoses, No cyanosis	  Neurologic: Non-focal  Extremities: Normal range of motion, No clubbing, cyanosis or edema  Vascular: Peripheral pulses palpable 2+ bilaterally    MEDICATIONS  (STANDING):  dextrose 5%. 1000 milliLiter(s) (50 mL/Hr) IV Continuous <Continuous>  dextrose 50% Injectable 12.5 Gram(s) IV Push once  dextrose 50% Injectable 25 Gram(s) IV Push once  dextrose 50% Injectable 25 Gram(s) IV Push once  heparin  Injectable 5000 Unit(s) SubCutaneous every 8 hours  insulin glargine Injectable (LANTUS) 20 Unit(s) SubCutaneous at bedtime  insulin lispro (HumaLOG) corrective regimen sliding scale   SubCutaneous three times a day before meals  insulin lispro (HumaLOG) corrective regimen sliding scale   SubCutaneous at bedtime  insulin lispro Injectable (HumaLOG) 6 Unit(s) SubCutaneous three times a day before meals    MEDICATIONS  (PRN):  acetaminophen   Tablet .. 650 milliGRAM(s) Oral every 6 hours PRN Temp greater or equal to 38C (100.4F), Mild Pain (1 - 3), Moderate Pain (4 - 6)  dextrose 40% Gel 15 Gram(s) Oral once PRN Blood Glucose LESS THAN 70 milliGRAM(s)/deciLiter  glucagon  Injectable 1 milliGRAM(s) IntraMuscular once PRN Glucose <70 milliGRAM(s)/deciLiter  ondansetron Injectable 4 milliGRAM(s) IV Push every 6 hours PRN Nausea and/or Vomiting    LABS:	 	                        11.5   7.74  )-----------( 258      ( 03 Apr 2020 06:00 )             35.4   04-03    132<L>  |  95<L>  |  10  ----------------------------<  149<H>  3.6   |  27  |  0.45<L>    Ca    8.7      03 Apr 2020 06:00    TPro  7.0  /  Alb  3.0<L>  /  TBili  0.4  /  DBili  x   /  AST  25  /  ALT  5   /  AlkPhos  55  04-03    PT/INR - ( 01 Apr 2020 12:05 )   PT: 14.1 SEC;   INR: 1.23     PTT - ( 01 Apr 2020 12:05 )  PTT:31.1 SEC               < from: Xray Chest 1 View AP/PA (04.01.20 @ 12:56) >    EXAM:  XR CHEST AP OR PA 1V        PROCEDURE DATE:  Apr 1 2020         INTERPRETATION:  Procedure : Single AP view of the chest was obtained.   Clinical Information : Cough and fever.  Comparison Exam : None.    FINDINGS:    The heart and the mediastinal silhouettes are unremarkable.   There is no evidence of pleural effusion. Moderate parenchymal infiltrates involving both lung bases and left perihilar region.  The bony structures are unremarkable.      IMPRESSION:    Moderate bilateral parenchymal infiltrates.      ELODIA MANCILLA M.D., ATTENDING RADIOLOGIST  This document has been electronically signed. Apr 1 2020  1:04PM Cardiology/Hospitalist Attending Inpatient Note    No significant overnight events  States that she feels better compared to yesterday  Currently on 2L NC, 95-98% O2 sat  Continue to wean supplemental O2 requirement  Continue supportive management    Vital Signs Last 24 Hrs  T(C): 37.8 (03 Apr 2020 04:33), Max: 37.9 (02 Apr 2020 16:37)  T(F): 100 (03 Apr 2020 04:33), Max: 100.2 (02 Apr 2020 16:37)  HR: 90 (03 Apr 2020 04:33) (90 - 110)  BP: 100/55 (03 Apr 2020 04:33) (90/51 - 131/79)  BP(mean): --  RR: 20 (03 Apr 2020 04:33) (19 - 20)  SpO2: 98% (03 Apr 2020 04:33) (91% - 98%)    I&O's Detail  None recorded    Appearance: NAD  HEENT:   Normal oral mucosa, PERRL, EOMI	  Cardiovascular: Normal S1 S2, No JVD, No murmurs, No edema  Respiratory: Coarse breath sounds bilaterally  Psychiatry: Awake, alert  Gastrointestinal:  Soft, Non-tender, + BS	  Skin: No rashes, No ecchymoses, No cyanosis	  Neurologic: Non-focal  Extremities: Normal range of motion, No clubbing, cyanosis or edema  Vascular: Peripheral pulses palpable 2+ bilaterally    MEDICATIONS  (STANDING):  dextrose 5%. 1000 milliLiter(s) (50 mL/Hr) IV Continuous <Continuous>  dextrose 50% Injectable 12.5 Gram(s) IV Push once  dextrose 50% Injectable 25 Gram(s) IV Push once  dextrose 50% Injectable 25 Gram(s) IV Push once  heparin  Injectable 5000 Unit(s) SubCutaneous every 8 hours  insulin glargine Injectable (LANTUS) 20 Unit(s) SubCutaneous at bedtime  insulin lispro (HumaLOG) corrective regimen sliding scale   SubCutaneous three times a day before meals  insulin lispro (HumaLOG) corrective regimen sliding scale   SubCutaneous at bedtime  insulin lispro Injectable (HumaLOG) 6 Unit(s) SubCutaneous three times a day before meals    MEDICATIONS  (PRN):  acetaminophen   Tablet .. 650 milliGRAM(s) Oral every 6 hours PRN Temp greater or equal to 38C (100.4F), Mild Pain (1 - 3), Moderate Pain (4 - 6)  dextrose 40% Gel 15 Gram(s) Oral once PRN Blood Glucose LESS THAN 70 milliGRAM(s)/deciLiter  glucagon  Injectable 1 milliGRAM(s) IntraMuscular once PRN Glucose <70 milliGRAM(s)/deciLiter  ondansetron Injectable 4 milliGRAM(s) IV Push every 6 hours PRN Nausea and/or Vomiting    LABS:	 	                        11.5   7.74  )-----------( 258      ( 03 Apr 2020 06:00 )             35.4   04-03    132<L>  |  95<L>  |  10  ----------------------------<  149<H>  3.6   |  27  |  0.45<L>    Ca    8.7      03 Apr 2020 06:00    TPro  7.0  /  Alb  3.0<L>  /  TBili  0.4  /  DBili  x   /  AST  25  /  ALT  5   /  AlkPhos  55  04-03    PT/INR - ( 01 Apr 2020 12:05 )   PT: 14.1 SEC;   INR: 1.23     PTT - ( 01 Apr 2020 12:05 )  PTT:31.1 SEC               < from: Xray Chest 1 View AP/PA (04.01.20 @ 12:56) >    EXAM:  XR CHEST AP OR PA 1V        PROCEDURE DATE:  Apr 1 2020         INTERPRETATION:  Procedure : Single AP view of the chest was obtained.   Clinical Information : Cough and fever.  Comparison Exam : None.    FINDINGS:    The heart and the mediastinal silhouettes are unremarkable.   There is no evidence of pleural effusion. Moderate parenchymal infiltrates involving both lung bases and left perihilar region.  The bony structures are unremarkable.      IMPRESSION:    Moderate bilateral parenchymal infiltrates.      ELODIA MANCILLA M.D., ATTENDING RADIOLOGIST  This document has been electronically signed. Apr 1 2020  1:04PM

## 2020-04-03 NOTE — PROGRESS NOTE ADULT - ASSESSMENT
Patient is a 30yo woman with DM (reportedly diet-controlled but HgbA1C markedly elevated), 6 days poor PO, Vomiting x 4 days, SOB, dry cough, +fever.   Seen at urgent care yesterday, and was noted to be hypoxic, CXR w/ PNA. Fevers were not relieved with Tylenol.    COVID (+) on PCR.    Hemodynamically stable at the time of my exam.  She is 91-95% O2 sat on 2 L NC.  Not currently on antibiotics or antiviral agents at this time.  Spoke with ID (Antonietta Hartman) -- agree hydroxychloroquine can be started given respiratory distress.  Patient otherwise had no additional active complaints except for generalized fatigue and myalgias.    Continue supportive management. Patient is a 28yo woman with DM (reportedly diet-controlled but HgbA1C markedly elevated), 6 days poor PO, Vomiting x 4 days, SOB, dry cough, +fever.   Seen at urgent care yesterday, and was noted to be hypoxic, CXR w/ PNA. Fevers were not relieved with Tylenol.    COVID (+) on PCR.    Hemodynamically stable at the time of my exam.  She is 91-95% O2 sat on 2 L NC.  Not currently on antibiotics or antiviral agents at this time.  No significant overnight events  States that she feels better compared to yesterday  Currently on 2L NC, 95-98% O2 sat  Continue to wean supplemental O2 requirement  Continue supportive management

## 2020-04-04 LAB
ALBUMIN SERPL ELPH-MCNC: 3 G/DL — LOW (ref 3.3–5)
ALBUMIN SERPL ELPH-MCNC: 3 G/DL — LOW (ref 3.3–5)
ALP SERPL-CCNC: 55 U/L — SIGNIFICANT CHANGE UP (ref 40–120)
ALP SERPL-CCNC: 55 U/L — SIGNIFICANT CHANGE UP (ref 40–120)
ALT FLD-CCNC: 9 U/L — SIGNIFICANT CHANGE UP (ref 4–33)
ALT FLD-CCNC: 9 U/L — SIGNIFICANT CHANGE UP (ref 4–33)
ANION GAP SERPL CALC-SCNC: 14 MMO/L — SIGNIFICANT CHANGE UP (ref 7–14)
ANION GAP SERPL CALC-SCNC: 14 MMO/L — SIGNIFICANT CHANGE UP (ref 7–14)
AST SERPL-CCNC: 21 U/L — SIGNIFICANT CHANGE UP (ref 4–32)
AST SERPL-CCNC: 21 U/L — SIGNIFICANT CHANGE UP (ref 4–32)
BASOPHILS # BLD AUTO: 0.07 K/UL — SIGNIFICANT CHANGE UP (ref 0–0.2)
BASOPHILS NFR BLD AUTO: 1 % — SIGNIFICANT CHANGE UP (ref 0–2)
BILIRUB SERPL-MCNC: 0.5 MG/DL — SIGNIFICANT CHANGE UP (ref 0.2–1.2)
BILIRUB SERPL-MCNC: 0.5 MG/DL — SIGNIFICANT CHANGE UP (ref 0.2–1.2)
BUN SERPL-MCNC: 10 MG/DL — SIGNIFICANT CHANGE UP (ref 7–23)
BUN SERPL-MCNC: 10 MG/DL — SIGNIFICANT CHANGE UP (ref 7–23)
CALCIUM SERPL-MCNC: 9 MG/DL — SIGNIFICANT CHANGE UP (ref 8.4–10.5)
CALCIUM SERPL-MCNC: 9 MG/DL — SIGNIFICANT CHANGE UP (ref 8.4–10.5)
CHLORIDE SERPL-SCNC: 98 MMOL/L — SIGNIFICANT CHANGE UP (ref 98–107)
CHLORIDE SERPL-SCNC: 98 MMOL/L — SIGNIFICANT CHANGE UP (ref 98–107)
CO2 SERPL-SCNC: 24 MMOL/L — SIGNIFICANT CHANGE UP (ref 22–31)
CO2 SERPL-SCNC: 24 MMOL/L — SIGNIFICANT CHANGE UP (ref 22–31)
CREAT SERPL-MCNC: 0.56 MG/DL — SIGNIFICANT CHANGE UP (ref 0.5–1.3)
CREAT SERPL-MCNC: 0.56 MG/DL — SIGNIFICANT CHANGE UP (ref 0.5–1.3)
CRP SERPL-MCNC: 91.2 MG/L — HIGH
EOSINOPHIL # BLD AUTO: 0.03 K/UL — SIGNIFICANT CHANGE UP (ref 0–0.5)
EOSINOPHIL NFR BLD AUTO: 0.4 % — SIGNIFICANT CHANGE UP (ref 0–6)
FERRITIN SERPL-MCNC: 895.6 NG/ML — HIGH (ref 15–150)
GLUCOSE BLDC GLUCOMTR-MCNC: 111 MG/DL — HIGH (ref 70–99)
GLUCOSE BLDC GLUCOMTR-MCNC: 132 MG/DL — HIGH (ref 70–99)
GLUCOSE BLDC GLUCOMTR-MCNC: 135 MG/DL — HIGH (ref 70–99)
GLUCOSE BLDC GLUCOMTR-MCNC: 145 MG/DL — HIGH (ref 70–99)
GLUCOSE BLDC GLUCOMTR-MCNC: 89 MG/DL — SIGNIFICANT CHANGE UP (ref 70–99)
GLUCOSE SERPL-MCNC: 119 MG/DL — HIGH (ref 70–99)
GLUCOSE SERPL-MCNC: 119 MG/DL — HIGH (ref 70–99)
HCT VFR BLD CALC: 39.6 % — SIGNIFICANT CHANGE UP (ref 34.5–45)
HGB BLD-MCNC: 12.8 G/DL — SIGNIFICANT CHANGE UP (ref 11.5–15.5)
IMM GRANULOCYTES NFR BLD AUTO: 1.4 % — SIGNIFICANT CHANGE UP (ref 0–1.5)
LYMPHOCYTES # BLD AUTO: 2.88 K/UL — SIGNIFICANT CHANGE UP (ref 1–3.3)
LYMPHOCYTES # BLD AUTO: 39.3 % — SIGNIFICANT CHANGE UP (ref 13–44)
MAGNESIUM SERPL-MCNC: 1.6 MG/DL — SIGNIFICANT CHANGE UP (ref 1.6–2.6)
MCHC RBC-ENTMCNC: 28.2 PG — SIGNIFICANT CHANGE UP (ref 27–34)
MCHC RBC-ENTMCNC: 32.3 % — SIGNIFICANT CHANGE UP (ref 32–36)
MCV RBC AUTO: 87.2 FL — SIGNIFICANT CHANGE UP (ref 80–100)
MONOCYTES # BLD AUTO: 0.39 K/UL — SIGNIFICANT CHANGE UP (ref 0–0.9)
MONOCYTES NFR BLD AUTO: 5.3 % — SIGNIFICANT CHANGE UP (ref 2–14)
NEUTROPHILS # BLD AUTO: 3.86 K/UL — SIGNIFICANT CHANGE UP (ref 1.8–7.4)
NEUTROPHILS NFR BLD AUTO: 52.6 % — SIGNIFICANT CHANGE UP (ref 43–77)
NRBC # FLD: 0 K/UL — SIGNIFICANT CHANGE UP (ref 0–0)
PLATELET # BLD AUTO: 326 K/UL — SIGNIFICANT CHANGE UP (ref 150–400)
PMV BLD: 10.6 FL — SIGNIFICANT CHANGE UP (ref 7–13)
POTASSIUM SERPL-MCNC: 3.7 MMOL/L — SIGNIFICANT CHANGE UP (ref 3.5–5.3)
POTASSIUM SERPL-MCNC: 3.7 MMOL/L — SIGNIFICANT CHANGE UP (ref 3.5–5.3)
POTASSIUM SERPL-SCNC: 3.7 MMOL/L — SIGNIFICANT CHANGE UP (ref 3.5–5.3)
POTASSIUM SERPL-SCNC: 3.7 MMOL/L — SIGNIFICANT CHANGE UP (ref 3.5–5.3)
PROCALCITONIN SERPL-MCNC: 0.04 NG/ML — SIGNIFICANT CHANGE UP (ref 0.02–0.1)
PROT SERPL-MCNC: 7.4 G/DL — SIGNIFICANT CHANGE UP (ref 6–8.3)
PROT SERPL-MCNC: 7.4 G/DL — SIGNIFICANT CHANGE UP (ref 6–8.3)
RBC # BLD: 4.54 M/UL — SIGNIFICANT CHANGE UP (ref 3.8–5.2)
RBC # FLD: 11.9 % — SIGNIFICANT CHANGE UP (ref 10.3–14.5)
SODIUM SERPL-SCNC: 136 MMOL/L — SIGNIFICANT CHANGE UP (ref 135–145)
SODIUM SERPL-SCNC: 136 MMOL/L — SIGNIFICANT CHANGE UP (ref 135–145)
WBC # BLD: 7.33 K/UL — SIGNIFICANT CHANGE UP (ref 3.8–10.5)
WBC # FLD AUTO: 7.33 K/UL — SIGNIFICANT CHANGE UP (ref 3.8–10.5)

## 2020-04-04 PROCEDURE — 99233 SBSQ HOSP IP/OBS HIGH 50: CPT

## 2020-04-04 RX ORDER — INSULIN LISPRO 100/ML
4 VIAL (ML) SUBCUTANEOUS
Refills: 0 | Status: DISCONTINUED | OUTPATIENT
Start: 2020-04-04 | End: 2020-04-06

## 2020-04-04 RX ADMIN — HEPARIN SODIUM 5000 UNIT(S): 5000 INJECTION INTRAVENOUS; SUBCUTANEOUS at 22:12

## 2020-04-04 RX ADMIN — Medication 4 UNIT(S): at 17:31

## 2020-04-04 RX ADMIN — HEPARIN SODIUM 5000 UNIT(S): 5000 INJECTION INTRAVENOUS; SUBCUTANEOUS at 12:45

## 2020-04-04 RX ADMIN — INSULIN GLARGINE 20 UNIT(S): 100 INJECTION, SOLUTION SUBCUTANEOUS at 22:12

## 2020-04-04 RX ADMIN — HEPARIN SODIUM 5000 UNIT(S): 5000 INJECTION INTRAVENOUS; SUBCUTANEOUS at 05:43

## 2020-04-04 RX ADMIN — Medication 200 MILLIGRAM(S): at 17:31

## 2020-04-04 RX ADMIN — Medication 400 MILLIGRAM(S): at 05:42

## 2020-04-04 RX ADMIN — INSULIN GLARGINE 20 UNIT(S): 100 INJECTION, SOLUTION SUBCUTANEOUS at 01:20

## 2020-04-04 RX ADMIN — Medication 6 UNIT(S): at 08:47

## 2020-04-04 NOTE — PROGRESS NOTE ADULT - SUBJECTIVE AND OBJECTIVE BOX
Cardiology/Hospitalist Attending Inpatient Note    No significant overnight events  States that she feels better compared to yesterday  Currently on 2L NC, 95-98% O2 sat  Continue to wean supplemental O2 requirement  Continue supportive management    Vital Signs Last 24 Hrs  T(C): 36.6 (04 Apr 2020 01:19), Max: 38.3 (03 Apr 2020 21:14)  T(F): 97.8 (04 Apr 2020 01:19), Max: 101 (03 Apr 2020 21:14)  HR: 87 (04 Apr 2020 01:19) (87 - 103)  BP: 120/55 (04 Apr 2020 01:19) (96/51 - 120/55)  BP(mean): 62 (03 Apr 2020 12:07) (62 - 62)  RR: 18 (04 Apr 2020 01:19) (18 - 20)  SpO2: 95% (04 Apr 2020 01:19) (94% - 95%)    I&O's Detail  None recorded    Appearance: NAD  HEENT:   Normal oral mucosa, PERRL, EOMI	  Cardiovascular: Normal S1 S2, No JVD, No murmurs, No edema  Respiratory: Coarse breath sounds bilaterally  Psychiatry: Awake, alert  Gastrointestinal:  Soft, Non-tender, + BS	  Skin: No rashes, No ecchymoses, No cyanosis	  Neurologic: Non-focal  Extremities: Normal range of motion, No clubbing, cyanosis or edema  Vascular: Peripheral pulses palpable 2+ bilaterally    MEDICATIONS  (STANDING):  dextrose 5%. 1000 milliLiter(s) (50 mL/Hr) IV Continuous <Continuous>  dextrose 50% Injectable 12.5 Gram(s) IV Push once  dextrose 50% Injectable 25 Gram(s) IV Push once  dextrose 50% Injectable 25 Gram(s) IV Push once  heparin  Injectable 5000 Unit(s) SubCutaneous every 8 hours  hydroxychloroquine   Oral   hydroxychloroquine 200 milliGRAM(s) Oral every 12 hours  insulin glargine Injectable (LANTUS) 20 Unit(s) SubCutaneous at bedtime  insulin lispro (HumaLOG) corrective regimen sliding scale   SubCutaneous three times a day before meals  insulin lispro (HumaLOG) corrective regimen sliding scale   SubCutaneous at bedtime  insulin lispro Injectable (HumaLOG) 6 Unit(s) SubCutaneous three times a day before meals    MEDICATIONS  (PRN):  acetaminophen   Tablet .. 650 milliGRAM(s) Oral every 6 hours PRN Temp greater or equal to 38C (100.4F), Mild Pain (1 - 3), Moderate Pain (4 - 6)  dextrose 40% Gel 15 Gram(s) Oral once PRN Blood Glucose LESS THAN 70 milliGRAM(s)/deciLiter  glucagon  Injectable 1 milliGRAM(s) IntraMuscular once PRN Glucose <70 milliGRAM(s)/deciLiter  ondansetron Injectable 4 milliGRAM(s) IV Push every 6 hours PRN Nausea and/or Vomiting    LABS:	 	                                   12.8   7.33  )-----------( 326      ( 04 Apr 2020 06:20 )             39.6     04-04    136  |  98  |  10  ----------------------------<  119<H>  3.7   |  24  |  0.56    Ca    9.0      04 Apr 2020 06:20  Mg     1.6     04-04    TPro  7.4  /  Alb  3.0<L>  /  TBili  0.5  /  DBili  x   /  AST  21  /  ALT  9   /  AlkPhos  55  04-04                 < from: Xray Chest 1 View AP/PA (04.01.20 @ 12:56) >    EXAM:  XR CHEST AP OR PA 1V        PROCEDURE DATE:  Apr 1 2020         INTERPRETATION:  Procedure : Single AP view of the chest was obtained.   Clinical Information : Cough and fever.  Comparison Exam : None.    FINDINGS:    The heart and the mediastinal silhouettes are unremarkable.   There is no evidence of pleural effusion. Moderate parenchymal infiltrates involving both lung bases and left perihilar region.  The bony structures are unremarkable.      IMPRESSION:    Moderate bilateral parenchymal infiltrates.      ELODIA MANCILLA M.D., ATTENDING RADIOLOGIST  This document has been electronically signed. Apr 1 2020  1:04PM Cardiology/Hospitalist Attending Inpatient Note    No significant overnight events  States that she feels better compared to yesterday  Currently on 2L NC, 95-98% O2 sat  Continue to wean supplemental O2 requirement  Continue supportive management, on hydroxychloroquine --> can change to daily dosing as per [facility] policy on hydroxychloroquine use    Vital Signs Last 24 Hrs  T(C): 36.6 (04 Apr 2020 01:19), Max: 38.3 (03 Apr 2020 21:14)  T(F): 97.8 (04 Apr 2020 01:19), Max: 101 (03 Apr 2020 21:14)  HR: 87 (04 Apr 2020 01:19) (87 - 103)  BP: 120/55 (04 Apr 2020 01:19) (96/51 - 120/55)  BP(mean): 62 (03 Apr 2020 12:07) (62 - 62)  RR: 18 (04 Apr 2020 01:19) (18 - 20)  SpO2: 95% (04 Apr 2020 01:19) (94% - 95%)    I&O's Detail  None recorded    Appearance: NAD  HEENT:   Normal oral mucosa, PERRL, EOMI	  Cardiovascular: Normal S1 S2, No JVD, No murmurs, No edema  Respiratory: Coarse breath sounds bilaterally  Psychiatry: Awake, alert  Gastrointestinal:  Soft, Non-tender, + BS	  Skin: No rashes, No ecchymoses, No cyanosis	  Neurologic: Non-focal  Extremities: Normal range of motion, No clubbing, cyanosis or edema  Vascular: Peripheral pulses palpable 2+ bilaterally    MEDICATIONS  (STANDING):  dextrose 5%. 1000 milliLiter(s) (50 mL/Hr) IV Continuous <Continuous>  dextrose 50% Injectable 12.5 Gram(s) IV Push once  dextrose 50% Injectable 25 Gram(s) IV Push once  dextrose 50% Injectable 25 Gram(s) IV Push once  heparin  Injectable 5000 Unit(s) SubCutaneous every 8 hours  hydroxychloroquine   Oral   hydroxychloroquine 200 milliGRAM(s) Oral every 12 hours  insulin glargine Injectable (LANTUS) 20 Unit(s) SubCutaneous at bedtime  insulin lispro (HumaLOG) corrective regimen sliding scale   SubCutaneous three times a day before meals  insulin lispro (HumaLOG) corrective regimen sliding scale   SubCutaneous at bedtime  insulin lispro Injectable (HumaLOG) 6 Unit(s) SubCutaneous three times a day before meals    MEDICATIONS  (PRN):  acetaminophen   Tablet .. 650 milliGRAM(s) Oral every 6 hours PRN Temp greater or equal to 38C (100.4F), Mild Pain (1 - 3), Moderate Pain (4 - 6)  dextrose 40% Gel 15 Gram(s) Oral once PRN Blood Glucose LESS THAN 70 milliGRAM(s)/deciLiter  glucagon  Injectable 1 milliGRAM(s) IntraMuscular once PRN Glucose <70 milliGRAM(s)/deciLiter  ondansetron Injectable 4 milliGRAM(s) IV Push every 6 hours PRN Nausea and/or Vomiting    LABS:	 	                                   12.8   7.33  )-----------( 326      ( 04 Apr 2020 06:20 )             39.6     04-04    136  |  98  |  10  ----------------------------<  119<H>  3.7   |  24  |  0.56    Ca    9.0      04 Apr 2020 06:20  Mg     1.6     04-04    TPro  7.4  /  Alb  3.0<L>  /  TBili  0.5  /  DBili  x   /  AST  21  /  ALT  9   /  AlkPhos  55  04-04                 < from: Xray Chest 1 View AP/PA (04.01.20 @ 12:56) >    EXAM:  XR CHEST AP OR PA 1V        PROCEDURE DATE:  Apr 1 2020         INTERPRETATION:  Procedure : Single AP view of the chest was obtained.   Clinical Information : Cough and fever.  Comparison Exam : None.    FINDINGS:    The heart and the mediastinal silhouettes are unremarkable.   There is no evidence of pleural effusion. Moderate parenchymal infiltrates involving both lung bases and left perihilar region.  The bony structures are unremarkable.      IMPRESSION:    Moderate bilateral parenchymal infiltrates.      ELODIA MANCILLA M.D., ATTENDING RADIOLOGIST  This document has been electronically signed. Apr 1 2020  1:04PM

## 2020-04-04 NOTE — CHART NOTE - NSCHARTNOTEFT_GEN_A_CORE
This is a 29 woman w/ DM a/w hypoxic resp failure 2/2 COVID-19. Endo consulted for uncontrolled DM2 w/ hyperglycemia.    Patient noted with tightly controlled BG today at lunchtime. Recommendations for decreasing insulin regimen as outlined below.    MEDICATIONS  (STANDING):  dextrose 5%. 1000 milliLiter(s) (50 mL/Hr) IV Continuous <Continuous>  dextrose 50% Injectable 12.5 Gram(s) IV Push once  dextrose 50% Injectable 25 Gram(s) IV Push once  dextrose 50% Injectable 25 Gram(s) IV Push once  heparin  Injectable 5000 Unit(s) SubCutaneous every 8 hours  hydroxychloroquine   Oral   hydroxychloroquine 200 milliGRAM(s) Oral every 12 hours  insulin glargine Injectable (LANTUS) 20 Unit(s) SubCutaneous at bedtime  insulin lispro (HumaLOG) corrective regimen sliding scale   SubCutaneous three times a day before meals  insulin lispro (HumaLOG) corrective regimen sliding scale   SubCutaneous at bedtime  insulin lispro Injectable (HumaLOG) 6 Unit(s) SubCutaneous three times a day before meals    CAPILLARY BLOOD GLUCOSE    POCT Blood Glucose.: 89 mg/dL (04 Apr 2020 12:14)  POCT Blood Glucose.: 145 mg/dL (04 Apr 2020 08:45)  POCT Blood Glucose.: 111 mg/dL (04 Apr 2020 01:11)  POCT Blood Glucose.: 97 mg/dL (03 Apr 2020 22:23)  POCT Blood Glucose.: 97 mg/dL (03 Apr 2020 17:12)    04-04    136  |  98  |  10  ----------------------------<  119<H>  3.7   |  24  |  0.56    Ca    9.0      04 Apr 2020 06:20  Mg     1.6     04-04    TPro  7.4  /  Alb  3.0<L>  /  TBili  0.5  /  DBili  x   /  AST  21  /  ALT  9   /  AlkPhos  55  04-04        Hemoglobin A1C, Whole Blood: 12.2 % <H> [4.0 - 5.6] (04-01-20 @ 21:39)    DM 2 followup  Hba1c 12.2, uncontrolled  Inpatient BG target 100-180 mg/dl, patient below target today prandially, also noted with tight control at dinner and bedtime last night  Recommend decreasing Humalog to 4 units SQ TID before meals (Hold if NPO/not eating meal)  Continue Humalog LOW dose correctional scales as currently ordered  Continue Lantus 20 units SQ qHS, noted that fasting BG has been stable  Check BG before meals and bedtime  Consistent carbohydrate diet   Discharge Planning: Optimal plan given A1c 12.2% is basal/bolus insulin PENS, dose TBD. Patient reported on consult she was on insulin previously (not sure which insulin). Patient may need teaching on insulin pens if she has no prior experience. Our team will follow up this week to further evaluate.   Followup with [facility], [address] [place] [address], [address], [facility] [address], [phone]    Contact [phone] with questions/concerns

## 2020-04-04 NOTE — PROGRESS NOTE ADULT - ASSESSMENT
Patient is a 30yo woman with DM (reportedly diet-controlled but HgbA1C markedly elevated), 6 days poor PO, Vomiting x 4 days, SOB, dry cough, +fever.   Seen at urgent care yesterday, and was noted to be hypoxic, CXR w/ PNA. Fevers were not relieved with Tylenol.    COVID (+) on PCR.    Hemodynamically stable at the time of my exam.  She is 91-95% O2 sat on 2 L NC.  Not currently on antibiotics or antiviral agents at this time.  No significant overnight events  States that she feels better compared to yesterday  Currently on 2L NC, 95-98% O2 sat  Continue to wean supplemental O2 requirement  Continue supportive management Patient is a 30yo woman with DM (reportedly diet-controlled but HgbA1C markedly elevated), 6 days poor PO, Vomiting x 4 days, SOB, dry cough, +fever.   Seen at urgent care yesterday, and was noted to be hypoxic, CXR w/ PNA. Fevers were not relieved with Tylenol.    COVID (+) on PCR.    No significant overnight events  States that she feels better compared to yesterday  Currently on 2L NC, 95-98% O2 sat  Continue to wean supplemental O2 requirement  Continue supportive management, on hydroxychloroquine --> can change to daily dosing as per new [facility] policy on hydroxychloroquine use  Appreciate Endocrine recommendations

## 2020-04-05 DIAGNOSIS — E78.5 HYPERLIPIDEMIA, UNSPECIFIED: ICD-10-CM

## 2020-04-05 LAB
ALBUMIN SERPL ELPH-MCNC: 2.5 G/DL — LOW (ref 3.3–5)
ALP SERPL-CCNC: 50 U/L — SIGNIFICANT CHANGE UP (ref 40–120)
ALT FLD-CCNC: 6 U/L — SIGNIFICANT CHANGE UP (ref 4–33)
ANION GAP SERPL CALC-SCNC: 10 MMO/L — SIGNIFICANT CHANGE UP (ref 7–14)
AST SERPL-CCNC: 20 U/L — SIGNIFICANT CHANGE UP (ref 4–32)
BASOPHILS # BLD AUTO: 0.06 K/UL — SIGNIFICANT CHANGE UP (ref 0–0.2)
BASOPHILS NFR BLD AUTO: 1 % — SIGNIFICANT CHANGE UP (ref 0–2)
BILIRUB SERPL-MCNC: 0.4 MG/DL — SIGNIFICANT CHANGE UP (ref 0.2–1.2)
BUN SERPL-MCNC: 10 MG/DL — SIGNIFICANT CHANGE UP (ref 7–23)
CALCIUM SERPL-MCNC: 8.7 MG/DL — SIGNIFICANT CHANGE UP (ref 8.4–10.5)
CHLORIDE SERPL-SCNC: 97 MMOL/L — LOW (ref 98–107)
CO2 SERPL-SCNC: 26 MMOL/L — SIGNIFICANT CHANGE UP (ref 22–31)
CREAT SERPL-MCNC: 0.53 MG/DL — SIGNIFICANT CHANGE UP (ref 0.5–1.3)
EOSINOPHIL # BLD AUTO: 0.07 K/UL — SIGNIFICANT CHANGE UP (ref 0–0.5)
EOSINOPHIL NFR BLD AUTO: 1.1 % — SIGNIFICANT CHANGE UP (ref 0–6)
GLUCOSE BLDC GLUCOMTR-MCNC: 120 MG/DL — HIGH (ref 70–99)
GLUCOSE BLDC GLUCOMTR-MCNC: 148 MG/DL — HIGH (ref 70–99)
GLUCOSE BLDC GLUCOMTR-MCNC: 187 MG/DL — HIGH (ref 70–99)
GLUCOSE BLDC GLUCOMTR-MCNC: 189 MG/DL — HIGH (ref 70–99)
GLUCOSE BLDC GLUCOMTR-MCNC: 194 MG/DL — HIGH (ref 70–99)
GLUCOSE SERPL-MCNC: 181 MG/DL — HIGH (ref 70–99)
HCT VFR BLD CALC: 35.8 % — SIGNIFICANT CHANGE UP (ref 34.5–45)
HGB BLD-MCNC: 10.9 G/DL — LOW (ref 11.5–15.5)
IMM GRANULOCYTES NFR BLD AUTO: 1.1 % — SIGNIFICANT CHANGE UP (ref 0–1.5)
LYMPHOCYTES # BLD AUTO: 2.71 K/UL — SIGNIFICANT CHANGE UP (ref 1–3.3)
LYMPHOCYTES # BLD AUTO: 44.3 % — HIGH (ref 13–44)
MAGNESIUM SERPL-MCNC: 1.6 MG/DL — SIGNIFICANT CHANGE UP (ref 1.6–2.6)
MANUAL SMEAR VERIFICATION: SIGNIFICANT CHANGE UP
MCHC RBC-ENTMCNC: 27.6 PG — SIGNIFICANT CHANGE UP (ref 27–34)
MCHC RBC-ENTMCNC: 30.4 % — LOW (ref 32–36)
MCV RBC AUTO: 90.6 FL — SIGNIFICANT CHANGE UP (ref 80–100)
MONOCYTES # BLD AUTO: 0.53 K/UL — SIGNIFICANT CHANGE UP (ref 0–0.9)
MONOCYTES NFR BLD AUTO: 8.7 % — SIGNIFICANT CHANGE UP (ref 2–14)
NEUTROPHILS # BLD AUTO: 2.68 K/UL — SIGNIFICANT CHANGE UP (ref 1.8–7.4)
NEUTROPHILS NFR BLD AUTO: 43.8 % — SIGNIFICANT CHANGE UP (ref 43–77)
NRBC # FLD: 0 K/UL — SIGNIFICANT CHANGE UP (ref 0–0)
PLATELET # BLD AUTO: 367 K/UL — SIGNIFICANT CHANGE UP (ref 150–400)
PMV BLD: 10.1 FL — SIGNIFICANT CHANGE UP (ref 7–13)
POTASSIUM SERPL-MCNC: 3.8 MMOL/L — SIGNIFICANT CHANGE UP (ref 3.5–5.3)
POTASSIUM SERPL-SCNC: 3.8 MMOL/L — SIGNIFICANT CHANGE UP (ref 3.5–5.3)
PROT SERPL-MCNC: 6.9 G/DL — SIGNIFICANT CHANGE UP (ref 6–8.3)
RBC # BLD: 3.95 M/UL — SIGNIFICANT CHANGE UP (ref 3.8–5.2)
RBC # FLD: 11.9 % — SIGNIFICANT CHANGE UP (ref 10.3–14.5)
SODIUM SERPL-SCNC: 133 MMOL/L — LOW (ref 135–145)
WBC # BLD: 6.12 K/UL — SIGNIFICANT CHANGE UP (ref 3.8–10.5)
WBC # FLD AUTO: 6.12 K/UL — SIGNIFICANT CHANGE UP (ref 3.8–10.5)

## 2020-04-05 PROCEDURE — 99231 SBSQ HOSP IP/OBS SF/LOW 25: CPT

## 2020-04-05 PROCEDURE — 99233 SBSQ HOSP IP/OBS HIGH 50: CPT

## 2020-04-05 RX ORDER — INSULIN LISPRO 100/ML
4 VIAL (ML) SUBCUTANEOUS
Qty: 5 | Refills: 0
Start: 2020-04-05 | End: 2020-05-04

## 2020-04-05 RX ORDER — ENOXAPARIN SODIUM 100 MG/ML
20 INJECTION SUBCUTANEOUS
Qty: 5 | Refills: 0
Start: 2020-04-05 | End: 2020-05-04

## 2020-04-05 RX ORDER — ISOPROPYL ALCOHOL, BENZOCAINE .7; .06 ML/ML; ML/ML
1 SWAB TOPICAL
Qty: 100 | Refills: 1
Start: 2020-04-05 | End: 2020-05-24

## 2020-04-05 RX ADMIN — Medication 1: at 09:17

## 2020-04-05 RX ADMIN — Medication 4 UNIT(S): at 09:17

## 2020-04-05 RX ADMIN — Medication 4 UNIT(S): at 16:48

## 2020-04-05 RX ADMIN — Medication 200 MILLIGRAM(S): at 16:48

## 2020-04-05 RX ADMIN — INSULIN GLARGINE 20 UNIT(S): 100 INJECTION, SOLUTION SUBCUTANEOUS at 23:00

## 2020-04-05 RX ADMIN — HEPARIN SODIUM 5000 UNIT(S): 5000 INJECTION INTRAVENOUS; SUBCUTANEOUS at 12:04

## 2020-04-05 RX ADMIN — Medication 1: at 16:46

## 2020-04-05 RX ADMIN — Medication 4 UNIT(S): at 12:03

## 2020-04-05 RX ADMIN — HEPARIN SODIUM 5000 UNIT(S): 5000 INJECTION INTRAVENOUS; SUBCUTANEOUS at 05:15

## 2020-04-05 RX ADMIN — HEPARIN SODIUM 5000 UNIT(S): 5000 INJECTION INTRAVENOUS; SUBCUTANEOUS at 23:00

## 2020-04-05 RX ADMIN — Medication 200 MILLIGRAM(S): at 05:15

## 2020-04-05 NOTE — PROGRESS NOTE ADULT - ASSESSMENT
Patient is a 30yo woman with DM (reportedly diet-controlled but HgbA1C markedly elevated), 6 days poor PO, Vomiting x 4 days, SOB, dry cough, +fever.   Seen at urgent care yesterday, and was noted to be hypoxic, CXR w/ PNA. Fevers were not relieved with Tylenol.    COVID (+) on PCR.    No significant overnight events  States that she feels better compared to yesterday  Currently on 2L NC, 95-98% O2 sat  Continue to wean supplemental O2 requirement  Continue supportive management, on hydroxychloroquine --> can change to daily dosing as per new [facility] policy on hydroxychloroquine use  Appreciate Endocrine recommendations

## 2020-04-05 NOTE — PROGRESS NOTE ADULT - ASSESSMENT
This is a 29 woman with DM 2, admitted with hypoxic respiratory failure 2/2 COVID-19. Endocrine consulted for uncontrolled DM2 w/ hyperglycemia.    1. Uncontrolled type 2 diabetes mellitus with hyperglycemia  -Hba1c 12.2, uncontrolled  -Inpatient BG target 100-180 mg/dl, patient BG trend now improved and within target, last assessment 120 mg/dl  -Continue Lantus 20 units SQ qHS  -Continue Humalog 4 units SQ TID before meals (Hold if NPO/not eating meal)  -Continue Humalog low dose correctional scale SQ TID before meals and Humalog low dose bedtime correctional scale SQ qHS   -Carb consistent diet; registered dietitian consult while inpatient if possible  -Monitor BG before meals and bedtime    Discharge Plan:   -Discharge on basal/bolus insulin PENS, final dose TBD. If trending within target on current regimen, anticipate current doses.  -Patient reports she needs new glucometer. Also will need supplies including glucose test strips, lancets, alcohol swabs and insulin PEN needles  -Patient gave her preferred pharmacy as [facility], on [address] [facility] in [place]. Preferred pharmacy entered into prescription writer and diabetes supplies logged in, ready to be sent. Please ensure these supplies are sent to pharmacy at time of discharge.   -Patient can follow up with [facility], [address] [place] [address], [address], [facility] [address], [phone]    2. HLD  -Recommend fasting lipid panel to be obtained & follow up outpatient

## 2020-04-05 NOTE — PROGRESS NOTE ADULT - SUBJECTIVE AND OBJECTIVE BOX
Cardiology/Hospitalist Attending Inpatient Note    No significant overnight events  States that she feels better compared to yesterday  Currently on 2L NC, 95-98% O2 sat  Continue to wean supplemental O2 requirement  Continue supportive management, on hydroxychloroquine --> can change to daily dosing as per [facility] policy on hydroxychloroquine use    Vital Signs Last 24 Hrs  T(C): 36.6 (05 Apr 2020 05:28), Max: 37.2 (04 Apr 2020 22:03)  T(F): 97.8 (05 Apr 2020 05:28), Max: 98.9 (04 Apr 2020 22:03)  HR: 80 (05 Apr 2020 05:28) (80 - 91)  BP: 113/60 (05 Apr 2020 05:28) (108/57 - 113/60)  BP(mean): --  RR: 18 (05 Apr 2020 05:28) (18 - 18)  SpO2: 95% (05 Apr 2020 05:28) (94% - 95%)    I&O's Detail  None recorded    Appearance: NAD  HEENT:   Normal oral mucosa, PERRL, EOMI	  Cardiovascular: Normal S1 S2, No JVD, No murmurs, No edema  Respiratory: Coarse breath sounds bilaterally  Psychiatry: Awake, alert  Gastrointestinal:  Soft, Non-tender, + BS	  Skin: No rashes, No ecchymoses, No cyanosis	  Neurologic: Non-focal  Extremities: Normal range of motion, No clubbing, cyanosis or edema  Vascular: Peripheral pulses palpable 2+ bilaterally    MEDICATIONS  (STANDING):  dextrose 5%. 1000 milliLiter(s) (50 mL/Hr) IV Continuous <Continuous>  dextrose 50% Injectable 12.5 Gram(s) IV Push once  dextrose 50% Injectable 25 Gram(s) IV Push once  dextrose 50% Injectable 25 Gram(s) IV Push once  heparin  Injectable 5000 Unit(s) SubCutaneous every 8 hours  hydroxychloroquine   Oral   hydroxychloroquine 200 milliGRAM(s) Oral every 12 hours  insulin glargine Injectable (LANTUS) 20 Unit(s) SubCutaneous at bedtime  insulin lispro (HumaLOG) corrective regimen sliding scale   SubCutaneous three times a day before meals  insulin lispro (HumaLOG) corrective regimen sliding scale   SubCutaneous at bedtime  insulin lispro Injectable (HumaLOG) 4 Unit(s) SubCutaneous three times a day before meals    MEDICATIONS  (PRN):  acetaminophen   Tablet .. 650 milliGRAM(s) Oral every 6 hours PRN Temp greater or equal to 38C (100.4F), Mild Pain (1 - 3), Moderate Pain (4 - 6)  dextrose 40% Gel 15 Gram(s) Oral once PRN Blood Glucose LESS THAN 70 milliGRAM(s)/deciLiter  glucagon  Injectable 1 milliGRAM(s) IntraMuscular once PRN Glucose <70 milliGRAM(s)/deciLiter  ondansetron Injectable 4 milliGRAM(s) IV Push every 6 hours PRN Nausea and/or Vomiting    LABS:	 	                                      10.9   6.12  )-----------( 367      ( 05 Apr 2020 05:08 )             35.8   04-04    136  |  98  |  10  ----------------------------<  119<H>  3.7   |  24  |  0.56    Ca    9.0      04 Apr 2020 06:20  Mg     1.6     04-04    TPro  7.4  /  Alb  3.0<L>  /  TBili  0.5  /  DBili  x   /  AST  21  /  ALT  9   /  AlkPhos  55  04-04                       < from: Xray Chest 1 View AP/PA (04.01.20 @ 12:56) >    EXAM:  XR CHEST AP OR PA 1V        PROCEDURE DATE:  Apr 1 2020         INTERPRETATION:  Procedure : Single AP view of the chest was obtained.   Clinical Information : Cough and fever.  Comparison Exam : None.    FINDINGS:    The heart and the mediastinal silhouettes are unremarkable.   There is no evidence of pleural effusion. Moderate parenchymal infiltrates involving both lung bases and left perihilar region.  The bony structures are unremarkable.      IMPRESSION:    Moderate bilateral parenchymal infiltrates.      ELODIA MANCILLA M.D., ATTENDING RADIOLOGIST  This document has been electronically signed. Apr 1 2020  1:04PM Cardiology/Hospitalist Attending Inpatient Note    No significant overnight events  States that she feels better compared to yesterday  Currently on 2L NC, 95-98% O2 sat  Continue to wean supplemental O2 requirement  Continue supportive management, on hydroxychloroquine --> can change to daily dosing as per [facility] policy on hydroxychloroquine use    Vital Signs Last 24 Hrs  T(C): 36.6 (05 Apr 2020 05:28), Max: 37.2 (04 Apr 2020 22:03)  T(F): 97.8 (05 Apr 2020 05:28), Max: 98.9 (04 Apr 2020 22:03)  HR: 80 (05 Apr 2020 05:28) (80 - 91)  BP: 113/60 (05 Apr 2020 05:28) (108/57 - 113/60)  BP(mean): --  RR: 18 (05 Apr 2020 05:28) (18 - 18)  SpO2: 95% (05 Apr 2020 05:28) (94% - 95%)    I&O's Detail  None recorded    Appearance: NAD  HEENT:   Normal oral mucosa, PERRL, EOMI	  Cardiovascular: Normal S1 S2, No JVD, No murmurs, No edema  Respiratory: Coarse breath sounds bilaterally  Psychiatry: Awake, alert  Neurologic: Non-focal    MEDICATIONS  (STANDING):  dextrose 5%. 1000 milliLiter(s) (50 mL/Hr) IV Continuous <Continuous>  dextrose 50% Injectable 12.5 Gram(s) IV Push once  dextrose 50% Injectable 25 Gram(s) IV Push once  dextrose 50% Injectable 25 Gram(s) IV Push once  heparin  Injectable 5000 Unit(s) SubCutaneous every 8 hours  hydroxychloroquine   Oral   hydroxychloroquine 200 milliGRAM(s) Oral every 12 hours  insulin glargine Injectable (LANTUS) 20 Unit(s) SubCutaneous at bedtime  insulin lispro (HumaLOG) corrective regimen sliding scale   SubCutaneous three times a day before meals  insulin lispro (HumaLOG) corrective regimen sliding scale   SubCutaneous at bedtime  insulin lispro Injectable (HumaLOG) 4 Unit(s) SubCutaneous three times a day before meals    MEDICATIONS  (PRN):  acetaminophen   Tablet .. 650 milliGRAM(s) Oral every 6 hours PRN Temp greater or equal to 38C (100.4F), Mild Pain (1 - 3), Moderate Pain (4 - 6)  dextrose 40% Gel 15 Gram(s) Oral once PRN Blood Glucose LESS THAN 70 milliGRAM(s)/deciLiter  glucagon  Injectable 1 milliGRAM(s) IntraMuscular once PRN Glucose <70 milliGRAM(s)/deciLiter  ondansetron Injectable 4 milliGRAM(s) IV Push every 6 hours PRN Nausea and/or Vomiting    LABS:	 	                                      10.9   6.12  )-----------( 367      ( 05 Apr 2020 05:08 )             35.8   04-04    136  |  98  |  10  ----------------------------<  119<H>  3.7   |  24  |  0.56    Ca    9.0      04 Apr 2020 06:20  Mg     1.6     04-04    TPro  7.4  /  Alb  3.0<L>  /  TBili  0.5  /  DBili  x   /  AST  21  /  ALT  9   /  AlkPhos  55  04-04                       < from: Xray Chest 1 View AP/PA (04.01.20 @ 12:56) >    EXAM:  XR CHEST AP OR PA 1V        PROCEDURE DATE:  Apr 1 2020         INTERPRETATION:  Procedure : Single AP view of the chest was obtained.   Clinical Information : Cough and fever.  Comparison Exam : None.    FINDINGS:    The heart and the mediastinal silhouettes are unremarkable.   There is no evidence of pleural effusion. Moderate parenchymal infiltrates involving both lung bases and left perihilar region.  The bony structures are unremarkable.      IMPRESSION:    Moderate bilateral parenchymal infiltrates.      ELODIA MANCILLA M.D., ATTENDING RADIOLOGIST  This document has been electronically signed. Apr 1 2020  1:04PM

## 2020-04-05 NOTE — PROGRESS NOTE ADULT - SUBJECTIVE AND OBJECTIVE BOX
Chief Complaint: DM 2    Per current hospital medicine emergency protocol, in an effort to reduce COVID exposures and also conserve PPE for necessary encounters, followup below is obtained from chart review and discussion with patient over phone; without direct patient contact unless acute clinical changes.     History: History obtained from patient over the phone. Patient reports she is feeling better today. Reports she felt hungry for lunch today and ate full meal. Denies n/v, denies s/s of hypoglycemia.   Discussed with patient current uncontrolled HbA1c 12.2% and need to obtain better glucose control. Patient endorses that she was on insulin in the past when she was first diagnosed with diabetes (2012), was using basal/bolus insulin pens. Then she was switched to pills, was taking Janumet but endorses abdominal discomfort, also reports she felt very tired. Explained that basal/bolus regimen on discharge will provide best way to lower A1c. Patient is agreeable to starting basal/bolus insulin again upon discharge. Reports she knows how to use both insulin PENS and glucometer.     MEDICATIONS  (STANDING):  dextrose 5%. 1000 milliLiter(s) (50 mL/Hr) IV Continuous <Continuous>  dextrose 50% Injectable 12.5 Gram(s) IV Push once  dextrose 50% Injectable 25 Gram(s) IV Push once  dextrose 50% Injectable 25 Gram(s) IV Push once  heparin  Injectable 5000 Unit(s) SubCutaneous every 8 hours  hydroxychloroquine   Oral   hydroxychloroquine 200 milliGRAM(s) Oral every 12 hours  insulin glargine Injectable (LANTUS) 20 Unit(s) SubCutaneous at bedtime  insulin lispro (HumaLOG) corrective regimen sliding scale   SubCutaneous three times a day before meals  insulin lispro (HumaLOG) corrective regimen sliding scale   SubCutaneous at bedtime  insulin lispro Injectable (HumaLOG) 4 Unit(s) SubCutaneous three times a day before meals    MEDICATIONS  (PRN):  acetaminophen   Tablet .. 650 milliGRAM(s) Oral every 6 hours PRN Temp greater or equal to 38C (100.4F), Mild Pain (1 - 3), Moderate Pain (4 - 6)  dextrose 40% Gel 15 Gram(s) Oral once PRN Blood Glucose LESS THAN 70 milliGRAM(s)/deciLiter  glucagon  Injectable 1 milliGRAM(s) IntraMuscular once PRN Glucose <70 milliGRAM(s)/deciLiter  ondansetron Injectable 4 milliGRAM(s) IV Push every 6 hours PRN Nausea and/or Vomiting    Allergies  Allergy Status Unknown  Intolerances    Review of Systems:  HEENT: No pain  Cardiovascular: No chest pain  GI: No nausea, vomiting, abdominal pain  Endocrine: no hypoglycemia    PHYSICAL EXAM:  VITALS: T(C): 36.6 (04-05-20 @ 05:28)  T(F): 97.8 (04-05-20 @ 05:28), Max: 98.9 (04-04-20 @ 22:03)  HR: 80 (04-05-20 @ 05:28) (80 - 91)  BP: 113/60 (04-05-20 @ 05:28) (108/57 - 113/60)  RR:  (18 - 18)  SpO2:  (94% - 95%)  Wt(kg): --  Remainder of physical exam deferred   Reviewed physical exam findings from hospitalist attending    CAPILLARY BLOOD GLUCOSE    POCT Blood Glucose.: 120 mg/dL (05 Apr 2020 12:02)  POCT Blood Glucose.: 194 mg/dL (05 Apr 2020 09:15)  POCT Blood Glucose.: 148 mg/dL (05 Apr 2020 02:45)  POCT Blood Glucose.: 132 mg/dL (04 Apr 2020 22:00)  POCT Blood Glucose.: 135 mg/dL (04 Apr 2020 16:43)      04-05    133<L>  |  97<L>  |  10  ----------------------------<  181<H>  3.8   |  26  |  0.53    EGFR if : 149  EGFR if non : 128    Ca    8.7      04-05  Mg     1.6     04-05    TPro  6.9  /  Alb  2.5<L>  /  TBili  0.4  /  DBili  x   /  AST  20  /  ALT  6   /  AlkPhos  50  04-05      Hemoglobin A1C, Whole Blood: 12.2 % <H> [4.0 - 5.6] (04-01-20 @ 21:39)

## 2020-04-06 ENCOUNTER — TRANSCRIPTION ENCOUNTER (OUTPATIENT)
Age: 30
End: 2020-04-06

## 2020-04-06 VITALS
HEART RATE: 84 BPM | OXYGEN SATURATION: 94 % | TEMPERATURE: 98 F | RESPIRATION RATE: 18 BRPM | SYSTOLIC BLOOD PRESSURE: 130 MMHG | DIASTOLIC BLOOD PRESSURE: 71 MMHG

## 2020-04-06 LAB
ALBUMIN SERPL ELPH-MCNC: 2.9 G/DL — LOW (ref 3.3–5)
ALP SERPL-CCNC: 52 U/L — SIGNIFICANT CHANGE UP (ref 40–120)
ALT FLD-CCNC: 23 U/L — SIGNIFICANT CHANGE UP (ref 4–33)
ANION GAP SERPL CALC-SCNC: 13 MMO/L — SIGNIFICANT CHANGE UP (ref 7–14)
AST SERPL-CCNC: 40 U/L — HIGH (ref 4–32)
BASOPHILS # BLD AUTO: 0.02 K/UL — SIGNIFICANT CHANGE UP (ref 0–0.2)
BASOPHILS NFR BLD AUTO: 0.4 % — SIGNIFICANT CHANGE UP (ref 0–2)
BILIRUB SERPL-MCNC: 0.4 MG/DL — SIGNIFICANT CHANGE UP (ref 0.2–1.2)
BUN SERPL-MCNC: 10 MG/DL — SIGNIFICANT CHANGE UP (ref 7–23)
CALCIUM SERPL-MCNC: 9.2 MG/DL — SIGNIFICANT CHANGE UP (ref 8.4–10.5)
CHLORIDE SERPL-SCNC: 99 MMOL/L — SIGNIFICANT CHANGE UP (ref 98–107)
CO2 SERPL-SCNC: 25 MMOL/L — SIGNIFICANT CHANGE UP (ref 22–31)
CREAT SERPL-MCNC: 0.54 MG/DL — SIGNIFICANT CHANGE UP (ref 0.5–1.3)
CULTURE RESULTS: SIGNIFICANT CHANGE UP
CULTURE RESULTS: SIGNIFICANT CHANGE UP
EOSINOPHIL # BLD AUTO: 0.12 K/UL — SIGNIFICANT CHANGE UP (ref 0–0.5)
EOSINOPHIL NFR BLD AUTO: 2.2 % — SIGNIFICANT CHANGE UP (ref 0–6)
GLUCOSE BLDC GLUCOMTR-MCNC: 159 MG/DL — HIGH (ref 70–99)
GLUCOSE BLDC GLUCOMTR-MCNC: 177 MG/DL — HIGH (ref 70–99)
GLUCOSE BLDC GLUCOMTR-MCNC: 194 MG/DL — HIGH (ref 70–99)
GLUCOSE SERPL-MCNC: 168 MG/DL — HIGH (ref 70–99)
HCT VFR BLD CALC: 35.8 % — SIGNIFICANT CHANGE UP (ref 34.5–45)
HGB BLD-MCNC: 11.2 G/DL — LOW (ref 11.5–15.5)
IMM GRANULOCYTES NFR BLD AUTO: 1.3 % — SIGNIFICANT CHANGE UP (ref 0–1.5)
LYMPHOCYTES # BLD AUTO: 2.63 K/UL — SIGNIFICANT CHANGE UP (ref 1–3.3)
LYMPHOCYTES # BLD AUTO: 48 % — HIGH (ref 13–44)
MAGNESIUM SERPL-MCNC: 1.6 MG/DL — SIGNIFICANT CHANGE UP (ref 1.6–2.6)
MCHC RBC-ENTMCNC: 27.5 PG — SIGNIFICANT CHANGE UP (ref 27–34)
MCHC RBC-ENTMCNC: 31.3 % — LOW (ref 32–36)
MCV RBC AUTO: 87.7 FL — SIGNIFICANT CHANGE UP (ref 80–100)
MONOCYTES # BLD AUTO: 0.48 K/UL — SIGNIFICANT CHANGE UP (ref 0–0.9)
MONOCYTES NFR BLD AUTO: 8.8 % — SIGNIFICANT CHANGE UP (ref 2–14)
NEUTROPHILS # BLD AUTO: 2.16 K/UL — SIGNIFICANT CHANGE UP (ref 1.8–7.4)
NEUTROPHILS NFR BLD AUTO: 39.3 % — LOW (ref 43–77)
NRBC # FLD: 0 K/UL — SIGNIFICANT CHANGE UP (ref 0–0)
PLATELET # BLD AUTO: 426 K/UL — HIGH (ref 150–400)
PMV BLD: 10.5 FL — SIGNIFICANT CHANGE UP (ref 7–13)
POTASSIUM SERPL-MCNC: 3.8 MMOL/L — SIGNIFICANT CHANGE UP (ref 3.5–5.3)
POTASSIUM SERPL-SCNC: 3.8 MMOL/L — SIGNIFICANT CHANGE UP (ref 3.5–5.3)
PROT SERPL-MCNC: 7.4 G/DL — SIGNIFICANT CHANGE UP (ref 6–8.3)
RBC # BLD: 4.08 M/UL — SIGNIFICANT CHANGE UP (ref 3.8–5.2)
RBC # FLD: 12 % — SIGNIFICANT CHANGE UP (ref 10.3–14.5)
SODIUM SERPL-SCNC: 137 MMOL/L — SIGNIFICANT CHANGE UP (ref 135–145)
SPECIMEN SOURCE: SIGNIFICANT CHANGE UP
SPECIMEN SOURCE: SIGNIFICANT CHANGE UP
WBC # BLD: 5.48 K/UL — SIGNIFICANT CHANGE UP (ref 3.8–10.5)
WBC # FLD AUTO: 5.48 K/UL — SIGNIFICANT CHANGE UP (ref 3.8–10.5)

## 2020-04-06 PROCEDURE — 99239 HOSP IP/OBS DSCHRG MGMT >30: CPT

## 2020-04-06 RX ORDER — ISOPROPYL ALCOHOL, BENZOCAINE .7; .06 ML/ML; ML/ML
1 SWAB TOPICAL
Qty: 100 | Refills: 1
Start: 2020-04-06 | End: 2020-05-25

## 2020-04-06 RX ORDER — HYDROXYCHLOROQUINE SULFATE 200 MG
1 TABLET ORAL
Qty: 3 | Refills: 0
Start: 2020-04-06

## 2020-04-06 RX ADMIN — HEPARIN SODIUM 5000 UNIT(S): 5000 INJECTION INTRAVENOUS; SUBCUTANEOUS at 05:05

## 2020-04-06 RX ADMIN — Medication 1: at 12:12

## 2020-04-06 RX ADMIN — Medication 4 UNIT(S): at 12:12

## 2020-04-06 RX ADMIN — Medication 4 UNIT(S): at 08:54

## 2020-04-06 RX ADMIN — HEPARIN SODIUM 5000 UNIT(S): 5000 INJECTION INTRAVENOUS; SUBCUTANEOUS at 14:58

## 2020-04-06 RX ADMIN — Medication 200 MILLIGRAM(S): at 05:05

## 2020-04-06 RX ADMIN — Medication 1: at 08:54

## 2020-04-06 NOTE — DISCHARGE NOTE PROVIDER - NSDCCPCAREPLAN_GEN_ALL_CORE_FT
PRINCIPAL DISCHARGE DIAGNOSIS  Diagnosis: Coronavirus infection  Assessment and Plan of Treatment: You have been diagnosed with the COVID-19 virus during your hospital stay. You must self quarantine to complete a 14 day time period.  Monitor for fevers, shortness of breath and cough primarily.  Monitor your temperature daily to note any changes and increases.    It has been determined that you no longer need hospitalization and can recover while remaining in self-quarantine at home. You should follow the prevention steps below until a healthcare provider or local or state health department says you can return to your normal activities.  1. You should restrict activities outside your home, except for getting medical care.  2. Do not go to work, school, or public areas.  3. Avoid using public transportation, ride-sharing, or taxis.  4. Separate yourself from other people and animals in your home.  5. Call ahead before visiting your doctor.  6. Wear a facemask.  7. Cover your coughs and sneezes.  8. Clean your hands often.  9. Avoid sharing personal household items.  10. Clean all “high-touch” surfaces everyday.  11. Monitor your symptoms.  If you have a medical emergency and need to Call the office for an appointment 911, notify the dispatch personnel that you have COVID-19 If possible, put on a facemask before emergency medical services arrive.  12. Stopping home isolation.  Patients with confirmed COVID-19 should remain under home isolation precautions for 14 days since the positive COVID-19 test and until the risk of secondary transmission to others is thought to be low. The decision to discontinue home isolation precautions should be made on a case-by-case basis, in consultation with healthcare providers and state and local health departments. Your [facility] Department of Health can be reached at [phone] for further information about COVID-19.      SECONDARY DISCHARGE DIAGNOSES  Diagnosis: Uncontrolled type 2 diabetes mellitus with hyperglycemia  Assessment and Plan of Treatment: Follow-up with your PCP outpatient.    Diagnosis: Hyperlipidemia LDL goal <70  Assessment and Plan of Treatment: Follow-up with Endocrinology outpatient.    Diagnosis: Hypoxia  Assessment and Plan of Treatment: Cleared for discharge.    Diagnosis: Hyperglycemia  Assessment and Plan of Treatment: Continued home medications sent to the pharmacy. PRINCIPAL DISCHARGE DIAGNOSIS  Diagnosis: Coronavirus infection  Assessment and Plan of Treatment: You have been diagnosed with the COVID-19 virus during your hospital stay. You must self quarantine to complete a 14 day time period.  Monitor for fevers, shortness of breath and cough primarily.  Monitor your temperature daily to note any changes and increases.    It has been determined that you no longer need hospitalization and can recover while remaining in self-quarantine at home. You should follow the prevention steps below until a healthcare provider or local or state health department says you can return to your normal activities.  1. You should restrict activities outside your home, except for getting medical care.  2. Do not go to work, school, or public areas.  3. Avoid using public transportation, ride-sharing, or taxis.  4. Separate yourself from other people and animals in your home.  5. Call ahead before visiting your doctor.  6. Wear a facemask.  7. Cover your coughs and sneezes.  8. Clean your hands often.  9. Avoid sharing personal household items.  10. Clean all “high-touch” surfaces everyday.  11. Monitor your symptoms.  If you have a medical emergency and need to Call the office for an appointment 911, notify the dispatch personnel that you have COVID-19 If possible, put on a facemask before emergency medical services arrive.  12. Stopping home isolation.  Patients with confirmed COVID-19 should remain under home isolation precautions for 14 days since the positive COVID-19 test and until the risk of secondary transmission to others is thought to be low. The decision to discontinue home isolation precautions should be made on a case-by-case basis, in consultation with healthcare providers and state and local health departments. Your [facility] Department of Health can be reached at [phone] for further information about COVID-19.      SECONDARY DISCHARGE DIAGNOSES  Diagnosis: Hypoxia  Assessment and Plan of Treatment: Cleared for discharge.    Diagnosis: Uncontrolled type 2 diabetes mellitus with hyperglycemia  Assessment and Plan of Treatment: Continue consistent carbohydrate diet.  Monitor blood glucose levels throughout the day before meals and at bedtime.  Record blood sugars and bring to outpatient providers appointment in order to be reviewed by your doctor for management modifications.  Be aware of diabetes management symptoms including feeling cool and clammy may be related to low glucose levels.  Feeling hot and dry may indicate high glucose levels.  If  you feel these symptoms, check your blood sugar.  Make regular podiatry appointments in order to have feet checked for wounds and toe nails cut by a doctor to prevent infections.    Diagnosis: Hyperglycemia  Assessment and Plan of Treatment: Continued home medications sent to the pharmacy.

## 2020-04-06 NOTE — DISCHARGE NOTE PROVIDER - NSDCFUADDINST_GEN_ALL_CORE_FT
Regular activity as tolerated.     Call your PCP if you experience fevers, chills, worsening chest pain or problems breathing.

## 2020-04-06 NOTE — PROGRESS NOTE ADULT - REASON FOR ADMISSION
glycemic control and covid +

## 2020-04-06 NOTE — DISCHARGE NOTE PROVIDER - HOSPITAL COURSE
Patient is a 30y/o female, pmh of uncontrolled diabetes, presented with six days of poor oral intake, vomiting, SOB, dry cough, and fever. She was found to be COVID (+) on PCR, admitted for respiratory failure. She was initially requiring oxygen supplementation with nasal canula. She was treated with hydroxychloroquine and was weaned off of supplemental oxygen. On the day of discharge, the patient's oxygen saturation remained wnl during ambulation. She had adequate oral intake and was voiding freely. The patient will have a follow-up with her PCP in one week via TeleHealth.         During the hospital course the Endocrine service was consulted for hyperglycemia. Patient's glucose levels were well controlled with short-acting and long-acting insulin. Supplies for at home glucose control were sent to the pharmacy. The patient will follow-up outpatient with the [facility].         Patient is medically stable for discharge on 4/6/20 as per discussion with Dr. Hobson.

## 2020-04-06 NOTE — DISCHARGE NOTE NURSING/CASE MANAGEMENT/SOCIAL WORK - PATIENT PORTAL LINK FT
You can access the FollowMyHealth Patient Portal offered by [facility] by registering at the following website: [email or web address]. By joining [facility]’s FollowMyHealth portal, you will also be able to view your health information using other applications (apps) compatible with our system.

## 2020-04-06 NOTE — DISCHARGE NOTE PROVIDER - CARE PROVIDER_API CALL
Fernando Hobson (MD; PhD)  Cardiology; Internal Medicine; Vascular Medicine  [address] [place] [address], [place] [address]  Phone: [phone]  Fax: [phone]  Follow Up Time: 1 week    [facility],   [address] [place] [address], [address]  [facility] [address]  Phone: [phone]  Fax: (   )    -  Follow Up Time: 2 weeks

## 2020-04-06 NOTE — DISCHARGE NOTE PROVIDER - NSDCFUADDAPPT_GEN_ALL_CORE_FT
Please follow-up with Dr. Hobson in 1 week via TeleHealth (office number above).    Please follow-up with Endocrinology in 2-3 weeks (office number above).

## 2020-04-06 NOTE — PROGRESS NOTE ADULT - ASSESSMENT
Patient is a 30yo woman with DM (reportedly diet-controlled but HgbA1C markedly elevated), 6 days poor PO, Vomiting x 4 days, SOB, dry cough, +fever.   Seen at urgent care yesterday, and was noted to be hypoxic, CXR w/ PNA. Fevers were not relieved with Tylenol.    COVID (+) on PCR.    No significant overnight events  States that she feels better compared to yesterday  Currently on room air   Recommend checking ambulatory O2 sat prior to possible discharge to home later today  Appreciate Endocrine recommendations for outpatient DM2 management    F/U with me via TeleHealth in one week for reassessment of symptoms

## 2020-04-06 NOTE — PROGRESS NOTE ADULT - SUBJECTIVE AND OBJECTIVE BOX
Cardiology/Hospitalist Attending Inpatient Note    No significant overnight events  States that she feels better compared to yesterday  Currently on room air   Recommend checking ambulatory O2 sat prior to possible discharge to home later today  Appreciate Endocrine recommendations for outpatient DM2 management    F/U with me via TeleHealth in one week for reassessment of symptoms    Vital Signs Last 24 Hrs  T(C): 36.8 (05 Apr 2020 22:00), Max: 36.8 (05 Apr 2020 22:00)  T(F): 98.3 (05 Apr 2020 22:00), Max: 98.3 (05 Apr 2020 22:00)  HR: 115 (05 Apr 2020 22:15) (82 - 115)  BP: 110/57 (05 Apr 2020 22:00) (109/70 - 110/57)  BP(mean): --  RR: 20 (05 Apr 2020 22:15) (18 - 20)  SpO2: 95% (05 Apr 2020 22:15) (95% - 96%)    I&O's Detail  None recorded    Appearance: NAD  HEENT:   Normal oral mucosa, PERRL, EOMI	  Cardiovascular: Normal S1 S2, No JVD, No murmurs, No edema  Respiratory: Coarse breath sounds bilaterally  Psychiatry: Awake, alert  Neurologic: Non-focal    MEDICATIONS  (STANDING):  dextrose 5%. 1000 milliLiter(s) (50 mL/Hr) IV Continuous <Continuous>  dextrose 50% Injectable 12.5 Gram(s) IV Push once  dextrose 50% Injectable 25 Gram(s) IV Push once  dextrose 50% Injectable 25 Gram(s) IV Push once  heparin  Injectable 5000 Unit(s) SubCutaneous every 8 hours  hydroxychloroquine   Oral   hydroxychloroquine 200 milliGRAM(s) Oral every 12 hours  insulin glargine Injectable (LANTUS) 20 Unit(s) SubCutaneous at bedtime  insulin lispro (HumaLOG) corrective regimen sliding scale   SubCutaneous three times a day before meals  insulin lispro (HumaLOG) corrective regimen sliding scale   SubCutaneous at bedtime  insulin lispro Injectable (HumaLOG) 4 Unit(s) SubCutaneous three times a day before meals    MEDICATIONS  (PRN):  acetaminophen   Tablet .. 650 milliGRAM(s) Oral every 6 hours PRN Temp greater or equal to 38C (100.4F), Mild Pain (1 - 3), Moderate Pain (4 - 6)  dextrose 40% Gel 15 Gram(s) Oral once PRN Blood Glucose LESS THAN 70 milliGRAM(s)/deciLiter  glucagon  Injectable 1 milliGRAM(s) IntraMuscular once PRN Glucose <70 milliGRAM(s)/deciLiter  ondansetron Injectable 4 milliGRAM(s) IV Push every 6 hours PRN Nausea and/or Vomiting    LABS:	 	                                        11.2   5.48  )-----------( 426      ( 06 Apr 2020 06:30 )             35.8   04-06    137  |  99  |  10  ----------------------------<  168<H>  3.8   |  25  |  0.54    Ca    9.2      06 Apr 2020 06:30  Mg     1.6     04-06    TPro  7.4  /  Alb  2.9<L>  /  TBili  0.4  /  DBili  x   /  AST  40<H>  /  ALT  23  /  AlkPhos  52  04-06                         < from: Xray Chest 1 View AP/PA (04.01.20 @ 12:56) >    EXAM:  XR CHEST AP OR PA 1V        PROCEDURE DATE:  Apr 1 2020         INTERPRETATION:  Procedure : Single AP view of the chest was obtained.   Clinical Information : Cough and fever.  Comparison Exam : None.    FINDINGS:    The heart and the mediastinal silhouettes are unremarkable.   There is no evidence of pleural effusion. Moderate parenchymal infiltrates involving both lung bases and left perihilar region.  The bony structures are unremarkable.      IMPRESSION:    Moderate bilateral parenchymal infiltrates.      ELODIA MANCILLA M.D., ATTENDING RADIOLOGIST  This document has been electronically signed. Apr 1 2020  1:04PM

## 2020-04-06 NOTE — DISCHARGE NOTE PROVIDER - NSDCMRMEDTOKEN_GEN_ALL_CORE_FT
alcohol swabs : Apply topically to affected area 4 times a day   glucometer (per patient&#x27;s insurance): Test blood sugars four times a day. Dispense #1 glucometer.  glucose tablets: Follow instructions on bottle when sugar is low.  HumaLOG KwikPen 100 units/mL injectable solution: 4 unit(s) subcutaneous 3 times a day (before meals)   Insulin Pen Needles, 4mm: 1 application subcutaneously 4 times a day. ** Use with insulin pen **   lancets: 1 application subcutaneously 4 times a day   Lantus Solostar Pen 100 units/mL subcutaneous solution: 20 unit(s) subcutaneous once a day (at bedtime)   test strips (per patient&#x27;s insurance): 1 application subcutaneously 4 times a day. ** Compatible with patient&#x27;s glucometer ** alcohol swabs : Apply topically to affected area 4 times a day   glucometer (per patient&#x27;s insurance): Test blood sugars four times a day. Dispense #1 glucometer.  glucose tablets: Follow instructions on bottle when sugar is low.  HumaLOG KwikPen 100 units/mL injectable solution: 4 unit(s) subcutaneous 3 times a day (before meals)   hydroxychloroquine 200 mg oral tablet: 1 tab(s) orally 2 times a day MDD:2  Insulin Pen Needles, 4mm: 1 application subcutaneously 4 times a day. ** Use with insulin pen **   lancets: 1 application subcutaneously 4 times a day   Lantus Solostar Pen 100 units/mL subcutaneous solution: 20 unit(s) subcutaneous once a day (at bedtime)   test strips (per patient&#x27;s insurance): 1 application subcutaneously 4 times a day. ** Compatible with patient&#x27;s glucometer **

## 2020-04-06 NOTE — PROVIDER CONTACT NOTE (OTHER) - ASSESSMENT
Alert and oriented x4, VS stable, resting comfortably. patient is hard stick, IV in place from 4/1, flushes without difficulty, no IV fluids or medications,

## 2020-04-06 NOTE — DISCHARGE NOTE PROVIDER - PROVIDER TOKENS
PROVIDER:[TOKEN:[[id number]],FOLLOWUP:[1 week]],FREE:[LAST:[[facility]],PHONE:[phone]],FAX:[(   )    -],ADDRESS:[[address] [place] [address]],FOLLOWUP:[2 weeks]]

## 2020-04-07 PROBLEM — E11.9 TYPE 2 DIABETES MELLITUS WITHOUT COMPLICATIONS: Chronic | Status: ACTIVE | Noted: 2020-04-01

## 2020-04-07 RX ORDER — LANCING DEVICE
EACH MISCELLANEOUS
Qty: 100 | Refills: 0 | Status: ACTIVE | COMMUNITY
Start: 2020-04-07 | End: 1900-01-01

## 2020-04-07 RX ORDER — ELECTROLYTES/DEXTROSE
32G X 4 MM SOLUTION, ORAL ORAL
Qty: 100 | Refills: 0 | Status: ACTIVE | COMMUNITY
Start: 2020-04-07 | End: 1900-01-01

## 2020-04-14 RX ORDER — HYDROXYCHLOROQUINE SULFATE 200 MG/1
200 TABLET, FILM COATED ORAL TWICE DAILY
Refills: 0 | Status: ACTIVE | COMMUNITY

## 2020-04-15 ENCOUNTER — APPOINTMENT (OUTPATIENT)
Dept: CARDIOLOGY | Facility: CLINIC | Age: 30
End: 2020-04-15
Payer: COMMERCIAL

## 2020-04-15 VITALS — HEART RATE: 82 BPM | SYSTOLIC BLOOD PRESSURE: 127 MMHG | DIASTOLIC BLOOD PRESSURE: 92 MMHG

## 2020-04-15 PROCEDURE — 99205 OFFICE O/P NEW HI 60 MIN: CPT | Mod: 95

## 2020-04-19 NOTE — REVIEW OF SYSTEMS
[Shortness Of Breath] : shortness of breath [Dyspnea on exertion] : dyspnea during exertion [Cough] : cough [Wheezing] : wheezing [Negative] : Heme/Lymph

## 2020-04-19 NOTE — REASON FOR VISIT
[FreeTextEntry1] : Ms. Hernandez presents to establish care after recent hospitalization at [facility] for SOB related to COVID-19 infection.  She was also noted to have uncontrolled DM2.  Since leaving [facility], she reports gradual improvement in respiratory symptoms.  She states she still has mild exertional dyspnea and cough, but otherwise she denies having recent fevers, chest pain, palpitations.  She is asking for note to be excused from work.  She otherwise has no other issues.  All questions answered.  F/U in 1 month.\par \par \par Patient MILENA HERNANDEZ Invision MRN [id number] Hospital Visit [id number] [facility] - Attending Physician Fernando Hobson \par Status Complete \par  \par \par Hospital Course: \par Discharge Date	06-Apr-2020 \par Admission Date	01-Apr-2020 15:56 \par Reason for Admission	glycemic control and covid + \par Hospital Course	 \par Patient is a 30y/o female, pmh of uncontrolled diabetes, presented with six \par days of poor oral intake, vomiting, SOB, dry cough, and fever. She was found to \par be COVID (+) on PCR, admitted for respiratory failure. She was initially \par requiring oxygen supplementation with nasal canula. She was treated with \par hydroxychloroquine and was weaned off of supplemental oxygen. On the day of \par discharge, the patient's oxygen saturation remained wnl during ambulation. She \par had adequate oral intake and was voiding freely. The patient will have a \par follow-up with her PCP in one week via TeleHealth. \par \par During the hospital course the Endocrine service was consulted for \par hyperglycemia. Patient's glucose levels were well controlled with short-acting \par and long-acting insulin. Supplies for at home glucose control were sent to the \[facility] pharmacy. The patient will follow-up outpatient with the Endocrine Faculty \par Practice. \par \par Patient is medically stable for discharge on 4/6/20 as per discussion with  \dion Hobson. \par \par \par \par Med Reconciliation: \par Click to Modify Medication Indication on Note Save \par Medication Reconciliation Status	Admission Reconciliation is Completed \par Discharge Reconciliation is Completed \par Discharge Medications	alcohol swabs : Apply topically to affected area 4 times \par a day \par glucometer (per patient's insurance): Test blood sugars four times a day. \par Dispense #1 glucometer. \par glucose tablets: Follow instructions on bottle when sugar is low. \par HumaLOG KwikPen 100 units/mL injectable solution: 4 unit(s) subcutaneous 3 \par times a day (before meals) \par hydroxychloroquine 200 mg oral tablet: 1 tab(s) orally 2 times a day MDD:2 \par Insulin Pen Needles, 4mm: 1 application subcutaneously 4 times a day. ** Use \par with insulin pen ** \par lancets: 1 application subcutaneously 4 times a day \par Lantus Solostar Pen 100 units/mL subcutaneous solution: 20 unit(s) subcutaneous \par once a day (at bedtime) \par test strips (per patient's insurance): 1 application subcutaneously 4 \par times a day. ** Compatible with patient's glucometer ** \par \par . \par \par Care Plan/Procedures: \par Goal(s)	To get better and follow your care plan as instructed. \par Discharge Diagnoses, Assessment and Plan of Treatment	PRINCIPAL DISCHARGE \par DIAGNOSIS \par Diagnosis: Coronavirus infection \par Assessment and Plan of Treatment: You have been diagnosed with the COVID-19 \par virus during your hospital stay. You must self quarantine to complete a 14 day \par time period.  Monitor for fevers, shortness of breath and cough primarily. \par Monitor your temperature daily to note any changes and increases. \par It has been determined that you no longer need hospitalization and can recover \par while remaining in self-quarantine at home. You should follow the prevention \par steps below until a healthcare provider or local or state health department \par says you can return to your normal activities. \par 1. You should restrict activities outside your home, except for getting medical \par care. \par 2. Do not go to work, school, or public areas. \par 3. Avoid using public transportation, ride-sharing, or taxis. \par 4. Separate yourself from other people and animals in your home. \par 5. Call ahead before visiting your doctor. \par 6. Wear a facemask. \par 7. Cover your coughs and sneezes. \par 8. Clean your hands often. \par 9. Avoid sharing personal household items. \par 10. Clean all high-touch surfaces everyday. \par 11. Monitor your symptoms. \par If you have a medical emergency and need to Call the office for an appointment \par 911, notify the dispatch personnel that you have COVID-19 If possible, put on a \par facemask before emergency medical services arrive. \par 12. Stopping home isolation. \par Patients with confirmed COVID-19 should remain under home isolation precautions \par for 14 days since the positive COVID-19 test and until the risk of secondary \par transmission to others is thought to be low. The decision to discontinue home \par isolation precautions should be made on a case-by-case basis, in consultation \par with healthcare providers and state and local health departments. Your [place] \[facility] Department of Health can be reached at [phone] for further \par information about COVID-19. \par \par \par SECONDARY DISCHARGE DIAGNOSES \par Diagnosis: Hypoxia \par Assessment and Plan of Treatment: Cleared for discharge. \par \par Diagnosis: Uncontrolled type 2 diabetes mellitus with hyperglycemia \par Assessment and Plan of Treatment: Continue consistent carbohydrate diet. \par Monitor blood glucose levels throughout the day before meals and at bedtime. \par Record blood sugars and bring to outpatient providers appointment in order to \par be reviewed by your doctor for management modifications.  Be aware of diabetes \par management symptoms including feeling cool and clammy may be related to low \par glucose levels.  Feeling hot and dry may indicate high glucose levels.  If  you \par feel these symptoms, check your blood sugar.  Make regular podiatry \par appointments in order to have feet checked for wounds and toe nails cut by a \[facility] doctor to prevent infections. \par \par Diagnosis: Hyperglycemia \par Assessment and Plan of Treatment: Continued home medications sent to the \[facility] pharmacy. \par \par Follow Up: \par Care Providers for Follow up (PCP/Outpatient Provider)	Fernando Hobson (MD; PhD) \par Cardiology; Internal Medicine; Vascular Medicine \[facility] [address] [address] [place] [address], [address]  \[place] [address] \par Phone: [phone] \par Fax: [phone] \par Follow Up Time: 1 week \par \[facility], [address], [address] \par [place] [address] \par Phone: [phone] \par Fax: ( )  - \par Follow Up Time: 2 weeks \par Additional Scheduled Appointments	Please follow-up with Dr. Hobson in 1 week via \par TeleHealth (office number above). \par \par Please follow-up with Endocrinology in 2-3 weeks (office number above). \par Diet Instructions	Regular diet as tolerated. \par Activity	Showering allowed \par Additional Instructions	Regular activity as tolerated. \par \par Call your PCP if you experience fevers, chills, worsening chest pain or \par problems breathing. \par

## 2020-04-23 ENCOUNTER — APPOINTMENT (OUTPATIENT)
Dept: ENDOCRINOLOGY | Facility: CLINIC | Age: 30
End: 2020-04-23

## 2020-04-23 ENCOUNTER — APPOINTMENT (OUTPATIENT)
Dept: ENDOCRINOLOGY | Facility: CLINIC | Age: 30
End: 2020-04-23
Payer: COMMERCIAL

## 2020-04-24 ENCOUNTER — APPOINTMENT (OUTPATIENT)
Dept: ENDOCRINOLOGY | Facility: CLINIC | Age: 30
End: 2020-04-24
Payer: COMMERCIAL

## 2020-04-24 VITALS — WEIGHT: 223 LBS | HEIGHT: 63 IN | BODY MASS INDEX: 39.51 KG/M2

## 2020-04-24 PROCEDURE — G0108 DIAB MANAGE TRN  PER INDIV: CPT | Mod: 95

## 2020-04-24 RX ORDER — LANCETS 33 GAUGE
EACH MISCELLANEOUS
Qty: 3 | Refills: 0 | Status: ACTIVE | COMMUNITY
Start: 2020-04-24 | End: 1900-01-01

## 2020-04-24 RX ORDER — BLOOD-GLUCOSE METER
W/DEVICE EACH MISCELLANEOUS
Qty: 1 | Refills: 0 | Status: ACTIVE | COMMUNITY
Start: 2020-04-24 | End: 1900-01-01

## 2020-04-24 RX ORDER — BLOOD SUGAR DIAGNOSTIC
STRIP MISCELLANEOUS 4 TIMES DAILY
Qty: 4 | Refills: 0 | Status: ACTIVE | COMMUNITY
Start: 2020-04-24 | End: 1900-01-01

## 2020-04-24 RX ORDER — INSULIN GLARGINE 100 [IU]/ML
100 INJECTION, SOLUTION SUBCUTANEOUS
Qty: 2 | Refills: 0 | Status: ACTIVE | COMMUNITY
Start: 2020-04-07 | End: 1900-01-01

## 2020-04-24 RX ORDER — INSULIN LISPRO 100 [IU]/ML
100 INJECTION, SOLUTION INTRAVENOUS; SUBCUTANEOUS 3 TIMES DAILY
Qty: 1 | Refills: 0 | Status: ACTIVE | COMMUNITY
Start: 2020-04-07 | End: 1900-01-01

## 2020-05-20 ENCOUNTER — APPOINTMENT (OUTPATIENT)
Dept: CARDIOLOGY | Facility: CLINIC | Age: 30
End: 2020-05-20
Payer: COMMERCIAL

## 2020-05-20 DIAGNOSIS — Z79.4 TYPE 2 DIABETES MELLITUS W/OUT COMPLICATIONS: ICD-10-CM

## 2020-05-20 DIAGNOSIS — Z13.6 ENCOUNTER FOR SCREENING FOR CARDIOVASCULAR DISORDERS: ICD-10-CM

## 2020-05-20 DIAGNOSIS — U07.1 COVID-19: ICD-10-CM

## 2020-05-20 DIAGNOSIS — E11.65 TYPE 2 DIABETES MELLITUS WITH HYPERGLYCEMIA: ICD-10-CM

## 2020-05-20 DIAGNOSIS — R09.02 HYPOXEMIA: ICD-10-CM

## 2020-05-20 DIAGNOSIS — E11.9 TYPE 2 DIABETES MELLITUS W/OUT COMPLICATIONS: ICD-10-CM

## 2020-05-20 DIAGNOSIS — R06.02 SHORTNESS OF BREATH: ICD-10-CM

## 2020-05-20 DIAGNOSIS — Z00.00 ENCOUNTER FOR GENERAL ADULT MEDICAL EXAMINATION W/OUT ABNORMAL FINDINGS: ICD-10-CM

## 2020-05-20 PROCEDURE — 99214 OFFICE O/P EST MOD 30 MIN: CPT | Mod: 95

## 2020-05-20 NOTE — REASON FOR VISIT
[FreeTextEntry1] : Consent obtained. AW Touchpoint not working. [employer] telemedicine platform used.  \par \par Ms. Hernandez presents for f/u care after recent hospitalization at [facility] for SOB related to COVID-19 infection.  I last saw her on 4/15/20.  She was also noted to have uncontrolled DM2.  Since leaving [facility], she reports gradual improvement in respiratory symptoms.  She states she still has mild exertional dyspnea, but otherwise she denies having recent fevers, chest pain, palpitations.  She states that she is nearly back to her baseline health.\par \par She is asking for note to be excused from work.  She otherwise has no other issues.  All questions answered.  F/U in 2 months.\par \par Needs note stating that she was out of work from 4/1 to 5/18/20.\par The note needs to state that she needs reasonable accommodations to work from home because of her underlying high risk conditions (ie DM2) \par Email: [email or web address]\par \par \par Patient MILENA HERNANDEZ Invision MRN [id number] Hospital Visit [id number] [facility] - Attending Physician Fernando Hobson \par Status Complete \par  \par \par Hospital Course: \par Discharge Date	06-Apr-2020 \par Admission Date	01-Apr-2020 15:56 \par Reason for Admission	glycemic control and covid + \par Hospital Course	 \par Patient is a 30y/o female, pmh of uncontrolled diabetes, presented with six \par days of poor oral intake, vomiting, SOB, dry cough, and fever. She was found to \par be COVID (+) on PCR, admitted for respiratory failure. She was initially \par requiring oxygen supplementation with nasal canula. She was treated with \par hydroxychloroquine and was weaned off of supplemental oxygen. On the day of \par discharge, the patient's oxygen saturation remained wnl during ambulation. She \par had adequate oral intake and was voiding freely. The patient will have a \par follow-up with her PCP in one week via TeleHealth. \par \par During the hospital course the Endocrine service was consulted for \par hyperglycemia. Patient's glucose levels were well controlled with short-acting \par and long-acting insulin. Supplies for at home glucose control were sent to the \par pharmacy. The patient will follow-up outpatient with the [facility] \par [facility]. \par \par Patient is medically stable for discharge on 4/6/20 as per discussion with  \par Joce. \par \par \par \par Med Reconciliation: \par Click to Modify Medication Indication on Note Save \par Medication Reconciliation Status	Admission Reconciliation is Completed \par Discharge Reconciliation is Completed \par Discharge Medications	alcohol swabs : Apply topically to affected area 4 times \par a day \par glucometer (per patient's insurance): Test blood sugars four times a day. \par Dispense #1 glucometer. \par glucose tablets: Follow instructions on bottle when sugar is low. \par HumaLOG KwikPen 100 units/mL injectable solution: 4 unit(s) subcutaneous 3 \par times a day (before meals) \par hydroxychloroquine 200 mg oral tablet: 1 tab(s) orally 2 times a day MDD:2 \par Insulin Pen Needles, 4mm: 1 application subcutaneously 4 times a day. ** Use \par with insulin pen ** \par lancets: 1 application subcutaneously 4 times a day \par Lantus Solostar Pen 100 units/mL subcutaneous solution: 20 unit(s) subcutaneous \par once a day (at bedtime) \par test strips (per patient's insurance): 1 application subcutaneously 4 \par times a day. ** Compatible with patient's glucometer ** \par \par . \par \par Care Plan/Procedures: \par Goal(s)	To get better and follow your care plan as instructed. \par Discharge Diagnoses, Assessment and Plan of Treatment	PRINCIPAL DISCHARGE \par DIAGNOSIS \par Diagnosis: Coronavirus infection \par Assessment and Plan of Treatment: You have been diagnosed with the COVID-19 \par virus during your hospital stay. You must self quarantine to complete a 14 day \par time period.  Monitor for fevers, shortness of breath and cough primarily. \par Monitor your temperature daily to note any changes and increases. \par It has been determined that you no longer need hospitalization and can recover \par while remaining in self-quarantine at home. You should follow the prevention \par steps below until a healthcare provider or local or state health department \par says you can return to your normal activities. \par 1. You should restrict activities outside your home, except for getting medical \par care. \par 2. Do not go to work, school, or public areas. \par 3. Avoid using public transportation, ride-sharing, or taxis. \par 4. Separate yourself from other people and animals in your home. \par 5. Call ahead before visiting your doctor. \par 6. Wear a facemask. \par 7. Cover your coughs and sneezes. \par 8. Clean your hands often. \par 9. Avoid sharing personal household items. \par 10. Clean all high-touch surfaces everyday. \par 11. Monitor your symptoms. \par If you have a medical emergency and need to Call the office for an appointment \par 911, notify the dispatch personnel that you have COVID-19 If possible, put on a \par facemask before emergency medical services arrive. \par 12. Stopping home isolation. \par Patients with confirmed COVID-19 should remain under home isolation precautions \par for 14 days since the positive COVID-19 test and until the risk of secondary \par transmission to others is thought to be low. The decision to discontinue home \par isolation precautions should be made on a case-by-case basis, in consultation \par with healthcare providers and state and local health departments. Your [place] \[facility] Department of Health can be reached at [phone] for further \par information about COVID-19. \par \par \par SECONDARY DISCHARGE DIAGNOSES \par Diagnosis: Hypoxia \par Assessment and Plan of Treatment: Cleared for discharge. \par \par Diagnosis: Uncontrolled type 2 diabetes mellitus with hyperglycemia \par Assessment and Plan of Treatment: Continue consistent carbohydrate diet. \par Monitor blood glucose levels throughout the day before meals and at bedtime. \par Record blood sugars and bring to outpatient providers appointment in order to \par be reviewed by your doctor for management modifications.  Be aware of diabetes \par management symptoms including feeling cool and clammy may be related to low \par glucose levels.  Feeling hot and dry may indicate high glucose levels.  If  you \par feel these symptoms, check your blood sugar.  Make regular podiatry \par appointments in order to have feet checked for wounds and toe nails cut by a \par doctor to prevent infections. \par \par Diagnosis: Hyperglycemia \par Assessment and Plan of Treatment: Continued home medications sent to the \par pharmacy. \par \par Follow Up: \par Care Providers for Follow up (PCP/Outpatient Provider)	Fernando Hobson (MD; PhD) \par Cardiology; Internal Medicine; Vascular Medicine \par [address] [place] [address], [address]  \[place] [address] \par Phone: [phone] \par Fax: [phone] \par Follow Up Time: 1 week \par \par [facility], \par [address], [address] \par [facility] [address] \par Phone: [phone] \par Fax: ( )  - \par Follow Up Time: 2 weeks \par Additional Scheduled Appointments	Please follow-up with Dr. Hobson in 1 week via \par TeleHealth (office number above). \par \par Please follow-up with Endocrinology in 2-3 weeks (office number above). \par Diet Instructions	Regular diet as tolerated. \par Activity	Showering allowed \par Additional Instructions	Regular activity as tolerated. \par \par Call your PCP if you experience fevers, chills, worsening chest pain or \par problems breathing. \par

## 2020-05-20 NOTE — PHYSICAL EXAM
[General Appearance - In No Acute Distress] : no acute distress [Normal Appearance] : normal appearance [FreeTextEntry1] : Otherwise deferred

## 2020-06-18 PROBLEM — U07.1 COVID-19: Status: ACTIVE | Noted: 2020-04-14

## 2020-06-25 ENCOUNTER — APPOINTMENT (OUTPATIENT)
Dept: ENDOCRINOLOGY | Facility: CLINIC | Age: 30
End: 2020-06-25

## 2021-12-03 ENCOUNTER — EMERGENCY (EMERGENCY)
Facility: HOSPITAL | Age: 31
LOS: 1 days | Discharge: HOME | End: 2021-12-03
Attending: EMERGENCY MEDICINE
Payer: COMMERCIAL

## 2021-12-03 VITALS
HEIGHT: 65 IN | RESPIRATION RATE: 19 BRPM | DIASTOLIC BLOOD PRESSURE: 105 MMHG | TEMPERATURE: 98 F | OXYGEN SATURATION: 99 % | HEART RATE: 89 BPM | SYSTOLIC BLOOD PRESSURE: 156 MMHG | WEIGHT: 233.91 LBS

## 2021-12-03 DIAGNOSIS — E78.5 HYPERLIPIDEMIA, UNSPECIFIED: ICD-10-CM

## 2021-12-03 DIAGNOSIS — E66.01 MORBID (SEVERE) OBESITY DUE TO EXCESS CALORIES: ICD-10-CM

## 2021-12-03 DIAGNOSIS — E11.65 TYPE 2 DIABETES MELLITUS WITH HYPERGLYCEMIA: ICD-10-CM

## 2021-12-03 LAB
A1C WITH ESTIMATED AVERAGE GLUCOSE RESULT: 11.6 % — HIGH (ref 4–5.6)
ALBUMIN SERPL ELPH-MCNC: 3.8 G/DL — SIGNIFICANT CHANGE UP (ref 3.3–5)
ALP SERPL-CCNC: 93 U/L — SIGNIFICANT CHANGE UP (ref 40–120)
ALT FLD-CCNC: 11 U/L — SIGNIFICANT CHANGE UP (ref 10–45)
ANION GAP SERPL CALC-SCNC: 12 MMOL/L — SIGNIFICANT CHANGE UP (ref 5–17)
ANION GAP SERPL CALC-SCNC: 14 MMOL/L — SIGNIFICANT CHANGE UP (ref 5–17)
APPEARANCE UR: ABNORMAL
AST SERPL-CCNC: 17 U/L — SIGNIFICANT CHANGE UP (ref 10–40)
B-OH-BUTYR SERPL-SCNC: 0.7 MMOL/L — HIGH
B-OH-BUTYR SERPL-SCNC: 1.7 MMOL/L — HIGH
BACTERIA # UR AUTO: ABNORMAL
BASE EXCESS BLDV CALC-SCNC: 6.2 MMOL/L — HIGH (ref -2–2)
BASE EXCESS BLDV CALC-SCNC: 6.4 MMOL/L — HIGH (ref -2–2)
BASOPHILS # BLD AUTO: 0.02 K/UL — SIGNIFICANT CHANGE UP (ref 0–0.2)
BASOPHILS NFR BLD AUTO: 0.2 % — SIGNIFICANT CHANGE UP (ref 0–2)
BILIRUB SERPL-MCNC: 0.6 MG/DL — SIGNIFICANT CHANGE UP (ref 0.2–1.2)
BILIRUB UR-MCNC: NEGATIVE — SIGNIFICANT CHANGE UP
BUN SERPL-MCNC: 15 MG/DL — SIGNIFICANT CHANGE UP (ref 7–23)
BUN SERPL-MCNC: 18 MG/DL — SIGNIFICANT CHANGE UP (ref 7–23)
CA-I SERPL-SCNC: 1.18 MMOL/L — SIGNIFICANT CHANGE UP (ref 1.15–1.33)
CA-I SERPL-SCNC: 1.2 MMOL/L — SIGNIFICANT CHANGE UP (ref 1.15–1.33)
CALCIUM SERPL-MCNC: 8.8 MG/DL — SIGNIFICANT CHANGE UP (ref 8.4–10.5)
CALCIUM SERPL-MCNC: 9.3 MG/DL — SIGNIFICANT CHANGE UP (ref 8.4–10.5)
CHLORIDE BLDV-SCNC: 94 MMOL/L — LOW (ref 96–108)
CHLORIDE BLDV-SCNC: 99 MMOL/L — SIGNIFICANT CHANGE UP (ref 96–108)
CHLORIDE SERPL-SCNC: 92 MMOL/L — LOW (ref 96–108)
CHLORIDE SERPL-SCNC: 98 MMOL/L — SIGNIFICANT CHANGE UP (ref 96–108)
CO2 BLDV-SCNC: 33 MMOL/L — HIGH (ref 22–26)
CO2 BLDV-SCNC: 34 MMOL/L — HIGH (ref 22–26)
CO2 SERPL-SCNC: 25 MMOL/L — SIGNIFICANT CHANGE UP (ref 22–31)
CO2 SERPL-SCNC: 26 MMOL/L — SIGNIFICANT CHANGE UP (ref 22–31)
COLOR SPEC: YELLOW — SIGNIFICANT CHANGE UP
CREAT SERPL-MCNC: 0.69 MG/DL — SIGNIFICANT CHANGE UP (ref 0.5–1.3)
CREAT SERPL-MCNC: 0.69 MG/DL — SIGNIFICANT CHANGE UP (ref 0.5–1.3)
DIFF PNL FLD: NEGATIVE — SIGNIFICANT CHANGE UP
EOSINOPHIL # BLD AUTO: 0 K/UL — SIGNIFICANT CHANGE UP (ref 0–0.5)
EOSINOPHIL NFR BLD AUTO: 0 % — SIGNIFICANT CHANGE UP (ref 0–6)
EPI CELLS # UR: 5 /HPF — SIGNIFICANT CHANGE UP
ESTIMATED AVERAGE GLUCOSE: 286 MG/DL — HIGH (ref 68–114)
GAS PNL BLDV: 132 MMOL/L — LOW (ref 136–145)
GAS PNL BLDV: 135 MMOL/L — LOW (ref 136–145)
GAS PNL BLDV: SIGNIFICANT CHANGE UP
GLUCOSE BLDC GLUCOMTR-MCNC: 163 MG/DL — HIGH (ref 70–99)
GLUCOSE BLDC GLUCOMTR-MCNC: 213 MG/DL — HIGH (ref 70–99)
GLUCOSE BLDV-MCNC: 225 MG/DL — HIGH (ref 70–99)
GLUCOSE BLDV-MCNC: 368 MG/DL — HIGH (ref 70–99)
GLUCOSE SERPL-MCNC: 218 MG/DL — HIGH (ref 70–99)
GLUCOSE SERPL-MCNC: 357 MG/DL — HIGH (ref 70–99)
GLUCOSE UR QL: ABNORMAL
HCG SERPL-ACNC: <2 MIU/ML — SIGNIFICANT CHANGE UP
HCO3 BLDV-SCNC: 31 MMOL/L — HIGH (ref 22–29)
HCO3 BLDV-SCNC: 32 MMOL/L — HIGH (ref 22–29)
HCT VFR BLD CALC: 38 % — SIGNIFICANT CHANGE UP (ref 34.5–45)
HCT VFR BLDA CALC: 36 % — SIGNIFICANT CHANGE UP (ref 34.5–46.5)
HCT VFR BLDA CALC: 45 % — SIGNIFICANT CHANGE UP (ref 34.5–46.5)
HGB BLD CALC-MCNC: 11.9 G/DL — SIGNIFICANT CHANGE UP (ref 11.7–16.1)
HGB BLD CALC-MCNC: 15.1 G/DL — SIGNIFICANT CHANGE UP (ref 11.7–16.1)
HGB BLD-MCNC: 12.5 G/DL — SIGNIFICANT CHANGE UP (ref 11.5–15.5)
HYALINE CASTS # UR AUTO: 6 /LPF — HIGH (ref 0–2)
IMM GRANULOCYTES NFR BLD AUTO: 0.5 % — SIGNIFICANT CHANGE UP (ref 0–1.5)
KETONES UR-MCNC: ABNORMAL
LACTATE BLDV-MCNC: 1.4 MMOL/L — SIGNIFICANT CHANGE UP (ref 0.7–2)
LACTATE BLDV-MCNC: 2.1 MMOL/L — HIGH (ref 0.7–2)
LEUKOCYTE ESTERASE UR-ACNC: NEGATIVE — SIGNIFICANT CHANGE UP
LYMPHOCYTES # BLD AUTO: 1.93 K/UL — SIGNIFICANT CHANGE UP (ref 1–3.3)
LYMPHOCYTES # BLD AUTO: 18.8 % — SIGNIFICANT CHANGE UP (ref 13–44)
MCHC RBC-ENTMCNC: 28.8 PG — SIGNIFICANT CHANGE UP (ref 27–34)
MCHC RBC-ENTMCNC: 32.9 GM/DL — SIGNIFICANT CHANGE UP (ref 32–36)
MCV RBC AUTO: 87.6 FL — SIGNIFICANT CHANGE UP (ref 80–100)
MONOCYTES # BLD AUTO: 0.6 K/UL — SIGNIFICANT CHANGE UP (ref 0–0.9)
MONOCYTES NFR BLD AUTO: 5.9 % — SIGNIFICANT CHANGE UP (ref 2–14)
NEUTROPHILS # BLD AUTO: 7.65 K/UL — HIGH (ref 1.8–7.4)
NEUTROPHILS NFR BLD AUTO: 74.6 % — SIGNIFICANT CHANGE UP (ref 43–77)
NITRITE UR-MCNC: NEGATIVE — SIGNIFICANT CHANGE UP
NRBC # BLD: 0 /100 WBCS — SIGNIFICANT CHANGE UP (ref 0–0)
OTHER CELLS CSF MANUAL: 10.9 ML/DL — LOW (ref 18–22)
PCO2 BLDV: 45 MMHG — HIGH (ref 39–42)
PCO2 BLDV: 52 MMHG — HIGH (ref 39–42)
PH BLDV: 7.4 — SIGNIFICANT CHANGE UP (ref 7.32–7.43)
PH BLDV: 7.46 — HIGH (ref 7.32–7.43)
PH UR: 6.5 — SIGNIFICANT CHANGE UP (ref 5–8)
PLATELET # BLD AUTO: 274 K/UL — SIGNIFICANT CHANGE UP (ref 150–400)
PO2 BLDV: 26 MMHG — SIGNIFICANT CHANGE UP (ref 25–45)
PO2 BLDV: 32 MMHG — SIGNIFICANT CHANGE UP (ref 25–45)
POTASSIUM BLDV-SCNC: 3.4 MMOL/L — LOW (ref 3.5–5.1)
POTASSIUM BLDV-SCNC: 3.8 MMOL/L — SIGNIFICANT CHANGE UP (ref 3.5–5.1)
POTASSIUM SERPL-MCNC: 3.5 MMOL/L — SIGNIFICANT CHANGE UP (ref 3.5–5.3)
POTASSIUM SERPL-MCNC: 3.9 MMOL/L — SIGNIFICANT CHANGE UP (ref 3.5–5.3)
POTASSIUM SERPL-SCNC: 3.5 MMOL/L — SIGNIFICANT CHANGE UP (ref 3.5–5.3)
POTASSIUM SERPL-SCNC: 3.9 MMOL/L — SIGNIFICANT CHANGE UP (ref 3.5–5.3)
PROT SERPL-MCNC: 8.2 G/DL — SIGNIFICANT CHANGE UP (ref 6–8.3)
PROT UR-MCNC: 100 — SIGNIFICANT CHANGE UP
RBC # BLD: 4.34 M/UL — SIGNIFICANT CHANGE UP (ref 3.8–5.2)
RBC # FLD: 11.3 % — SIGNIFICANT CHANGE UP (ref 10.3–14.5)
RBC CASTS # UR COMP ASSIST: 4 /HPF — SIGNIFICANT CHANGE UP (ref 0–4)
SAO2 % BLDV: 48.6 % — LOW (ref 67–88)
SAO2 % BLDV: 57.4 % — LOW (ref 67–88)
SARS-COV-2 RNA SPEC QL NAA+PROBE: SIGNIFICANT CHANGE UP
SODIUM SERPL-SCNC: 131 MMOL/L — LOW (ref 135–145)
SODIUM SERPL-SCNC: 136 MMOL/L — SIGNIFICANT CHANGE UP (ref 135–145)
SP GR SPEC: 1.03 — HIGH (ref 1.01–1.02)
UROBILINOGEN FLD QL: NEGATIVE — SIGNIFICANT CHANGE UP
WBC # BLD: 10.25 K/UL — SIGNIFICANT CHANGE UP (ref 3.8–10.5)
WBC # FLD AUTO: 10.25 K/UL — SIGNIFICANT CHANGE UP (ref 3.8–10.5)
WBC UR QL: 2 /HPF — SIGNIFICANT CHANGE UP (ref 0–5)

## 2021-12-03 PROCEDURE — 99220: CPT

## 2021-12-03 PROCEDURE — 99245 OFF/OP CONSLTJ NEW/EST HI 55: CPT

## 2021-12-03 RX ORDER — HALOPERIDOL DECANOATE 100 MG/ML
2 INJECTION INTRAMUSCULAR ONCE
Refills: 0 | Status: COMPLETED | OUTPATIENT
Start: 2021-12-03 | End: 2021-12-03

## 2021-12-03 RX ORDER — ONDANSETRON 8 MG/1
4 TABLET, FILM COATED ORAL EVERY 6 HOURS
Refills: 0 | Status: DISCONTINUED | OUTPATIENT
Start: 2021-12-03 | End: 2021-12-06

## 2021-12-03 RX ORDER — DEXTROSE 50 % IN WATER 50 %
15 SYRINGE (ML) INTRAVENOUS ONCE
Refills: 0 | Status: DISCONTINUED | OUTPATIENT
Start: 2021-12-03 | End: 2021-12-06

## 2021-12-03 RX ORDER — DEXTROSE 50 % IN WATER 50 %
25 SYRINGE (ML) INTRAVENOUS ONCE
Refills: 0 | Status: DISCONTINUED | OUTPATIENT
Start: 2021-12-03 | End: 2021-12-06

## 2021-12-03 RX ORDER — ONDANSETRON 8 MG/1
4 TABLET, FILM COATED ORAL ONCE
Refills: 0 | Status: COMPLETED | OUTPATIENT
Start: 2021-12-03 | End: 2021-12-03

## 2021-12-03 RX ORDER — DEXTROSE 50 % IN WATER 50 %
12.5 SYRINGE (ML) INTRAVENOUS ONCE
Refills: 0 | Status: DISCONTINUED | OUTPATIENT
Start: 2021-12-03 | End: 2021-12-06

## 2021-12-03 RX ORDER — GLUCAGON INJECTION, SOLUTION 0.5 MG/.1ML
1 INJECTION, SOLUTION SUBCUTANEOUS ONCE
Refills: 0 | Status: DISCONTINUED | OUTPATIENT
Start: 2021-12-03 | End: 2021-12-06

## 2021-12-03 RX ORDER — SODIUM CHLORIDE 9 MG/ML
1000 INJECTION, SOLUTION INTRAVENOUS
Refills: 0 | Status: DISCONTINUED | OUTPATIENT
Start: 2021-12-03 | End: 2021-12-06

## 2021-12-03 RX ORDER — METOCLOPRAMIDE HCL 10 MG
10 TABLET ORAL EVERY 6 HOURS
Refills: 0 | Status: DISCONTINUED | OUTPATIENT
Start: 2021-12-03 | End: 2021-12-06

## 2021-12-03 RX ORDER — METOCLOPRAMIDE HCL 10 MG
10 TABLET ORAL ONCE
Refills: 0 | Status: COMPLETED | OUTPATIENT
Start: 2021-12-03 | End: 2021-12-03

## 2021-12-03 RX ORDER — INSULIN LISPRO 100/ML
7 VIAL (ML) SUBCUTANEOUS
Refills: 0 | Status: DISCONTINUED | OUTPATIENT
Start: 2021-12-03 | End: 2021-12-06

## 2021-12-03 RX ORDER — FAMOTIDINE 10 MG/ML
20 INJECTION INTRAVENOUS ONCE
Refills: 0 | Status: COMPLETED | OUTPATIENT
Start: 2021-12-03 | End: 2021-12-03

## 2021-12-03 RX ORDER — SODIUM CHLORIDE 9 MG/ML
1000 INJECTION INTRAMUSCULAR; INTRAVENOUS; SUBCUTANEOUS
Refills: 0 | Status: DISCONTINUED | OUTPATIENT
Start: 2021-12-03 | End: 2021-12-06

## 2021-12-03 RX ORDER — SODIUM CHLORIDE 9 MG/ML
1000 INJECTION, SOLUTION INTRAVENOUS ONCE
Refills: 0 | Status: COMPLETED | OUTPATIENT
Start: 2021-12-03 | End: 2021-12-03

## 2021-12-03 RX ORDER — INSULIN LISPRO 100/ML
VIAL (ML) SUBCUTANEOUS
Refills: 0 | Status: DISCONTINUED | OUTPATIENT
Start: 2021-12-03 | End: 2021-12-03

## 2021-12-03 RX ORDER — SODIUM CHLORIDE 9 MG/ML
1000 INJECTION INTRAMUSCULAR; INTRAVENOUS; SUBCUTANEOUS ONCE
Refills: 0 | Status: COMPLETED | OUTPATIENT
Start: 2021-12-03 | End: 2021-12-03

## 2021-12-03 RX ORDER — INSULIN LISPRO 100/ML
VIAL (ML) SUBCUTANEOUS AT BEDTIME
Refills: 0 | Status: DISCONTINUED | OUTPATIENT
Start: 2021-12-03 | End: 2021-12-06

## 2021-12-03 RX ORDER — INSULIN LISPRO 100/ML
VIAL (ML) SUBCUTANEOUS
Refills: 0 | Status: DISCONTINUED | OUTPATIENT
Start: 2021-12-03 | End: 2021-12-06

## 2021-12-03 RX ORDER — INSULIN GLARGINE 100 [IU]/ML
20 INJECTION, SOLUTION SUBCUTANEOUS AT BEDTIME
Refills: 0 | Status: DISCONTINUED | OUTPATIENT
Start: 2021-12-03 | End: 2021-12-06

## 2021-12-03 RX ADMIN — SODIUM CHLORIDE 125 MILLILITER(S): 9 INJECTION INTRAMUSCULAR; INTRAVENOUS; SUBCUTANEOUS at 16:03

## 2021-12-03 RX ADMIN — Medication 10 MILLIGRAM(S): at 20:49

## 2021-12-03 RX ADMIN — FAMOTIDINE 20 MILLIGRAM(S): 10 INJECTION INTRAVENOUS at 20:49

## 2021-12-03 RX ADMIN — ONDANSETRON 4 MILLIGRAM(S): 8 TABLET, FILM COATED ORAL at 09:44

## 2021-12-03 RX ADMIN — INSULIN GLARGINE 20 UNIT(S): 100 INJECTION, SOLUTION SUBCUTANEOUS at 23:31

## 2021-12-03 RX ADMIN — Medication 4: at 17:42

## 2021-12-03 RX ADMIN — ONDANSETRON 4 MILLIGRAM(S): 8 TABLET, FILM COATED ORAL at 16:11

## 2021-12-03 RX ADMIN — Medication 10 MILLIGRAM(S): at 11:37

## 2021-12-03 RX ADMIN — HALOPERIDOL DECANOATE 2 MILLIGRAM(S): 100 INJECTION INTRAMUSCULAR at 23:35

## 2021-12-03 RX ADMIN — Medication 8: at 13:39

## 2021-12-03 RX ADMIN — SODIUM CHLORIDE 2000 MILLILITER(S): 9 INJECTION, SOLUTION INTRAVENOUS at 09:44

## 2021-12-03 RX ADMIN — SODIUM CHLORIDE 1000 MILLILITER(S): 9 INJECTION INTRAMUSCULAR; INTRAVENOUS; SUBCUTANEOUS at 13:02

## 2021-12-03 NOTE — ED CDU PROVIDER DISPOSITION NOTE - PATIENT PORTAL LINK FT
You can access the FollowMyHealth Patient Portal offered by [facility] by registering at the following website: [email or web address]. By joining [email or web address] FollowMyHealth portal, you will also be able to view your health information using other applications (apps) compatible with our system.

## 2021-12-03 NOTE — ED CDU PROVIDER INITIAL DAY NOTE - MEDICAL DECISION MAKING DETAILS
31 y old f with history of DM ,Noncompliant with meds ,hx of DKA ,doesn't follow medical doctor ,presented with nausea vomiting, abdominal pain. use marijuana .concern for gastroparesis 2 to cannabin hyperemesis ,will admit to CDU ,iv hydration ,zofran ,endocrine cons reassess ZR

## 2021-12-03 NOTE — ED CDU PROVIDER DISPOSITION NOTE - ATTENDING CONTRIBUTION TO CARE
I personally have seen and examined this patient.  Physician assistant note reviewed and agree on plan of care and except where noted. Patient re-evaluated and doing well.  No acute issues at  this time.  Lab and radiology tests reviewed with patient and/or family.  Patient stable for discharge, tolerating po now, seen by endo for recommendations around outpt dm mgmt. pt has been non compliant with treatment, lost her mom recently to follow up with psych as oupto, denies si/hi.

## 2021-12-03 NOTE — ED ADULT NURSE NOTE - OBJECTIVE STATEMENT
31 y.o female, A&Ox3, PMH type 2 DM, pt presents to ED for N/V. pt states on Wednesday she began vomiting and had diarrhea, pt states she has ongoing nausea and vomiting, pt vomited this morning and small amount of diarrhea, pt denies bloody stool or vomit. pt denies abdominal pain and abdomin is nontender to touch. pt is a type 2 diabetic and states she does not check her blood sugar regularly and does not take her insulin regularly. pt states her LMP was mid October. IV access established, safety and comfort provided. 31 y.o female, A&Ox3, PMH type 2 DM, pt presents to ED for N/V. pt states on Wednesday she began vomiting and had diarrhea, pt states she has ongoing nausea and vomiting, pt vomited this morning and small amount of diarrhea, pt denies bloody stool or vomit. pt denies abdominal pain and abdomin is nontender to touch. pt is a type 2 diabetic and states she does not check her blood sugar regularly and does not take her insulin regularly. pt states her LMP was mid October. pt denies sob, fever, cough, headaches, urinary symptoms. IV access established, safety and comfort provided.

## 2021-12-03 NOTE — ED CDU PROVIDER DISPOSITION NOTE - NSPTACCESSSVCSAPPTDETAILS_ED_ALL_ED_FT
patient is a diabetic patient without a medical doctor. needs internal medicine (urgent) and endocrine follow-up outpatient.

## 2021-12-03 NOTE — ED CDU PROVIDER DISPOSITION NOTE - CLINICAL COURSE
31 year old woman with PMH T2DM since 2012 presenting with complaint of nausea and emesis since 12/1 AM (2 days ago). Patient reports episodes of emesis started on Wednesday AM and has had several since then. Emesis is non-bloody. Reports 1 day of diarrhea which resolved. Denies fevers/chills. Has not been able to tolerate PO since. Reports last took Lantus (20 units) on Saturday. States inconsistent with her insulin usage. No PCP or endocrinologist and reports her supply of Lantus is from the last time she was in the hospital last year for COVID (not intubated). Reports diagnosed with diabetes when presented with DKA but no episodes of DKA since then. Denies chest pain, shortness of breath. Denies polyuria, polydipsia. Lives with family and accompanied by sister. No sick contacts. Had first dose of 2 dose COVID vaccine (2nd dose due in a week per patient).  In ED pt was found to have elevated glucose. Endocrine was called for a consult.  pt was given IVF and antiemetics. sent to cdu for continued hydration, antiemetics, endocrine consult, and po challenge. 31 year old woman with PMH T2DM since 2012 presenting with complaint of nausea and emesis since 12/1 AM (2 days ago). Patient reports episodes of emesis started on Wednesday AM and has had several since then. Emesis is non-bloody. Reports 1 day of diarrhea which resolved. Denies fevers/chills. Has not been able to tolerate PO since. Reports last took Lantus (20 units) on Saturday. States inconsistent with her insulin usage. No PCP or endocrinologist and reports her supply of Lantus is from the last time she was in the hospital last year for COVID (not intubated). Reports diagnosed with diabetes when presented with DKA but no episodes of DKA since then. Denies chest pain, shortness of breath. Denies polyuria, polydipsia. Lives with family and accompanied by sister. No sick contacts. Had first dose of 2 dose COVID vaccine (2nd dose due in a week per patient).  In ED pt was found to have elevated glucose. Endocrine was called for a consult.  pt was given IVF and antiemetics. sent to cdu for continued hydration, antiemetics, endocrine consult, and po challenge.  CDU Course: Pt tolerated PO, ketones downtrended with insulin use and PO intake, pt felt well and requested DC home with her family member. d/w endocrinology who recommended lantus and trulicity outpt. Pt ready and stable at time of DC.

## 2021-12-03 NOTE — ED PROVIDER NOTE - PHYSICAL EXAMINATION
Vital Signs Last 24 Hrs  T(C): 36.9 (03 Dec 2021 08:50), Max: 36.9 (03 Dec 2021 08:50)  T(F): 98.5 (03 Dec 2021 08:50), Max: 98.5 (03 Dec 2021 08:50)  HR: 89 (03 Dec 2021 08:50) (89 - 89)  BP: 156/105 (03 Dec 2021 08:50) (156/105 - 156/105)  BP(mean): --  RR: 19 (03 Dec 2021 08:50) (19 - 19)  SpO2: 99% (03 Dec 2021 08:50) (99% - 99%) Vital Signs Last 24 Hrs  T(C): 36.9 (03 Dec 2021 08:50), Max: 36.9 (03 Dec 2021 08:50)  T(F): 98.5 (03 Dec 2021 08:50), Max: 98.5 (03 Dec 2021 08:50)  HR: 89 (03 Dec 2021 08:50) (89 - 89)  BP: 156/105 (03 Dec 2021 08:50) (156/105 - 156/105)  BP(mean): --  RR: 19 (03 Dec 2021 08:50) (19 - 19)  SpO2: 99% (03 Dec 2021 08:50) (99% - 99%)    General: Awake, alert, obese  HEENT: Normocephalic, atraumatic. EOMI. PERRL.   Pulmonary: Symmetric chest rise. No extra work of breathing. Lungs clear to auscultation bilaterally. No Kussmaul respirations.  Cardiovascular: Tachycardic. No murmurs/rubs/gallops  Abdominal: Soft, non-distended, non-tender, obese  Skin: No evident rashes or lesions  Neurologic: No focal defects  Psychiatric: Mood appropriate

## 2021-12-03 NOTE — CONSULT NOTE ADULT - PROBLEM SELECTOR RECOMMENDATION 2
Dietary and lifestyle modifications discussed for weight loss  Start GLP-1 for dual purpose of weight loss and diabetes management.

## 2021-12-03 NOTE — CONSULT NOTE ADULT - PROBLEM SELECTOR RECOMMENDATION 9
Diabetes Education and Nutrition Eval  Start Lantus 20 units qhs  Start Admelog 7 units qac  Mod correction scale qac + bedtime  Goal glucose 100-180  Outpt. endo follow-up  Outpt. optho, podiatry, micro/cr  Plan to d/c on basal +Trulicity or whichever GLP-1 is covered by insurance. Pt. already with nonadherence to medications so would try to simplify regimen as much as possible to help with adherence. Pt. tried metformin and Januvia with GI intolerance so will avoid. Does not report any issues with Trulicity in the past and she could benefit from the additional weight loss benefits.

## 2021-12-03 NOTE — ED CDU PROVIDER INITIAL DAY NOTE - PHYSICAL EXAMINATION
Vital Signs Last 24 Hrs  T(C): 36.9 (03 Dec 2021 08:50), Max: 36.9 (03 Dec 2021 08:50)  T(F): 98.5 (03 Dec 2021 08:50), Max: 98.5 (03 Dec 2021 08:50)  HR: 89 (03 Dec 2021 08:50) (89 - 89)  BP: 156/105 (03 Dec 2021 08:50) (156/105 - 156/105)  BP(mean): --  RR: 19 (03 Dec 2021 08:50) (19 - 19)  SpO2: 99% (03 Dec 2021 08:50) (99% - 99%)    General: Awake, alert, obese  HEENT: Normocephalic, atraumatic. EOMI. PERRL.   Pulmonary: Symmetric chest rise. No extra work of breathing. Lungs clear to auscultation bilaterally. No Kussmaul respirations.  Cardiovascular: Tachycardic. No murmurs/rubs/gallops  Abdominal: Soft, non-distended, non-tender, obese  Skin: No evident rashes or lesions  Neurologic: No focal defects  Psychiatric: Mood appropriate

## 2021-12-03 NOTE — ED PROVIDER NOTE - OBJECTIVE STATEMENT
31 year old woman with PMH T2DM since 2012 presenting with complaint of nausea and emesis since 12/1 AM (2 days ago). Patient reports episodes of emesis started on Wednesday AM and has had several since then. Emesis is non-bloody, brown in color. Reports 1 day of diarrhea, brown, which resolved. Denies fevers. Has not been able to tolerate PO since. Reports last took Lantus (20 units) on Saturday. States inconsistent with her insulin usage. No PCP or endocrinologist and reports her supply of Lantus is from the last time she was in the hospital last year for COVID (not intubated). Reports diagnosed with diabetes when presented with DKA but no episodes of DKA since then. Denies chest pain, shortness of breath. Denies polyuria, polydipsia. Lives with family and accompanied by sister. No sick contacts. Had first dose of 2 dose COVID vaccine (2nd dose due in a week per patient).

## 2021-12-03 NOTE — CONSULT NOTE ADULT - SUBJECTIVE AND OBJECTIVE BOX
HPI:  30 y/o F w/ hx of Type 2 DM since age 22 complicated by neuropathy. No endocrinologist. At home on Lantus 20 untis qhs (only takes on average once a week). Had been on metformin and Januvia with GI intolerance so switched to insulin. Reports possibly taking Trulicity but does not know why she stopped. Does not check glucose. No reported hypoglycemia or symptoms of hypoglycemia. Diet has been inconsistent due to stress and depression from the passing of her mother recently. Tries to avoid sweets. Drinks regular soda and diluted juice. Mother with hx of Type 2 DM. Presented with nausea and vomiting with hyperglycemia.       PAST MEDICAL & SURGICAL HISTORY:  Diabetes    No significant past surgical history        FAMILY HISTORY:  Mother- Type 2 DM         Social History: + Marijuana use. +social alcohol use    Outpatient Medications:  Lantus 20 units daily    MEDICATIONS  (STANDING):  dextrose 40% Gel 15 Gram(s) Oral once  dextrose 5%. 1000 milliLiter(s) (50 mL/Hr) IV Continuous <Continuous>  dextrose 5%. 1000 milliLiter(s) (100 mL/Hr) IV Continuous <Continuous>  dextrose 50% Injectable 25 Gram(s) IV Push once  dextrose 50% Injectable 12.5 Gram(s) IV Push once  dextrose 50% Injectable 25 Gram(s) IV Push once  glucagon  Injectable 1 milliGRAM(s) IntraMuscular once  insulin glargine Injectable (LANTUS) 20 Unit(s) SubCutaneous at bedtime  insulin lispro (ADMELOG) corrective regimen sliding scale   SubCutaneous three times a day before meals  insulin lispro (ADMELOG) corrective regimen sliding scale   SubCutaneous at bedtime  insulin lispro Injectable (ADMELOG) 7 Unit(s) SubCutaneous three times a day before meals  sodium chloride 0.9%. 1000 milliLiter(s) (125 mL/Hr) IV Continuous <Continuous>    MEDICATIONS  (PRN):  metoclopramide Injectable 10 milliGRAM(s) IV Push every 6 hours PRN nausea/vomiting  ondansetron Injectable 4 milliGRAM(s) IV Push every 6 hours PRN Nausea and/or Vomiting      Allergies    No Known Allergies    Intolerances      Review of Systems:  Constitutional: No fever, No change in weight  Eyes: No blurry vision  Neuro: No headache, +neuropathy  HEENT: No throat pain  Cardiovascular: No chest pain  Respiratory: No SOB  GI: + nausea and vomiting  : No polyuria  Skin: no rash  Psych: no depression  Endocrine: No polydipsia, No heat or cold intolerance, rest as noted in HPI  Hem/lymph: no swelling    All other review of systems negative      PHYSICAL EXAM:  VITALS: T(C): 36.9 (12-03-21 @ 09:54)  T(F): 98.4 (12-03-21 @ 09:54), Max: 98.5 (12-03-21 @ 08:50)  HR: 91 (12-03-21 @ 15:03) (89 - 97)  BP: 138/82 (12-03-21 @ 15:03) (138/82 - 166/102)  RR:  (19 - 20)  SpO2:  (98% - 100%)  Wt(kg): --  GENERAL: NAD at this time, obese  EYES: No proptosis, EOMI  HEENT:  Atraumatic, Normocephalic,   THYROID: Normal size, no palpable nodules  RESPIRATORY: Clear to auscultation bilaterally, full excursion, non-labored  CARDIOVASCULAR: Regular rhythm; No murmurs; no peripheral edema  GI: Soft, nontender, non distended, normal bowel sounds  SKIN: Dry, intact, No rashes or lesions  MUSCULOSKELETAL: normal strength  NEURO: follows commands  PSYCH: Alert and oriented x 3, normal affect, normal mood  CUSHING'S SIGNS: no striae      POCT Blood Glucose.: 264 mg/dL (12-03-21 @ 14:58)  POCT Blood Glucose.: 304 mg/dL (12-03-21 @ 12:46)  POCT Blood Glucose.: 353 mg/dL (12-03-21 @ 11:48)  POCT Blood Glucose.: 322 mg/dL (12-03-21 @ 09:24)                              12.5   10.25 )-----------( 274      ( 03 Dec 2021 10:12 )             38.0       12-03    131<L>  |  92<L>  |  18  ----------------------------<  357<H>  3.9   |  25  |  0.69    EGFR if : 134  EGFR if non : 116    Ca    9.3      12-03  Mg     1.6     12-03  Phos  2.8     12-03    TPro  8.2  /  Alb  3.8  /  TBili  0.6  /  DBili  x   /  AST  17  /  ALT  11  /  AlkPhos  93  12-03    Thyroid Function Tests:            Radiology:

## 2021-12-03 NOTE — ED CDU PROVIDER INITIAL DAY NOTE - OBJECTIVE STATEMENT
31 year old woman with PMH T2DM since 2012 presenting with complaint of nausea and emesis since 12/1 AM (2 days ago). Patient reports episodes of emesis started on Wednesday AM and has had several since then. Emesis is non-bloody. Reports 1 day of diarrhea which resolved. Denies fevers/chills. Has not been able to tolerate PO since. Reports last took Lantus (20 units) on Saturday. States inconsistent with her insulin usage. No PCP or endocrinologist and reports her supply of Lantus is from the last time she was in the hospital last year for COVID (not intubated). Reports diagnosed with diabetes when presented with DKA but no episodes of DKA since then. Denies chest pain, shortness of breath. Denies polyuria, polydipsia. Lives with family and accompanied by sister. No sick contacts. Had first dose of 2 dose COVID vaccine (2nd dose due in a week per patient).

## 2021-12-03 NOTE — CONSULT NOTE ADULT - ASSESSMENT
32 y/o F w/ uncontrolled Type 2 DM complicated by neuropathy with hyperglycemia with nonadherence to insulin admitted with nausea and vomiting (high risk patient with severe hyperglycemia with glucose values > 300's A1c pending at high risk of CAD and CVA with high level decision-making).

## 2021-12-03 NOTE — ED PROVIDER NOTE - CLINICAL SUMMARY MEDICAL DECISION MAKING FREE TEXT BOX
Patient with T2DM since 2012, insulin dependent, but inconsistently compliant, coming in with 2 days of nausea and emesis. Differential includes DKA given insulin non-compliance and elevated POC BG and symptoms. Also concerning for gastroenteritis given symptoms. Will obtain labs to evaluate for DKA. Patient will need insulin management/reinforcement of diabetes education. Patient with T2DM since 2012, insulin dependent, but inconsistently compliant, coming in with 2 days of nausea and emesis. Differential includes DKA given insulin non-compliance and elevated POC BG and symptoms. Also concerning for gastroenteritis given symptoms. Will obtain labs to evaluate for DKA. Patient will need insulin management/reinforcement of diabetes education. endo cons, CDU Observation ZR

## 2021-12-03 NOTE — ED CDU PROVIDER DISPOSITION NOTE - NSFOLLOWUPINSTRUCTIONS_ED_ALL_ED_FT
1. Rest. Stay hydrated.  2. Check your blood sugar before meals and at bedtime. Your diabetic regimen is.....  3. Follow-up with the medicine clinic in 2-3 days, [phone].  Follow up with endocrinologist [phone].  Bring your results with you for follow-up.  4. Return to the ER if you have any worsening symptoms, chest pain, difficulty breathing, fevers, chills, weakness, or any other concerns. 1. Rest. Stay hydrated.  2. Check your blood sugar before meals and at bedtime. Take Lantus 22 units each night, and Trulicity 0.75mg once per week.    3. Follow-up with the medicine clinic in 2-3 days, [phone].       Follow up with your endocrinologist or our endocrinology team (see information below): [phone].  Bring your results with you for follow-up.    --For all [facility] Practices phone numbers and locations throughout [facility] [place]:  CALL PATIENT ACCESS SERVICES  [phone]    --If you wish to follow up with [facility] at [place]    [facility]  [address], [address], [place] [address]  [phone]   Please call to schedule appointment with certified diabetes educator / Nutritionist and MD.    *Bring all printed lab/test results to your appointment(s).*    ------------------------------------------------------------------------------------------------------------------------  Additionally you will need annual follow-up appointments with the eye doctor (ophthalmologist) and the foot doctor (podiatrist) please see attached:    [facility]  Ophthalmology  [address], [address]  [place] [address]  Phone: [phone]      [facility]  Podiatry  [address] [place] [address] [place] [address]  Phone: [phone]  -------------------------------------------------------------------------------------------------------------------------      4. Return to the ER if you have any worsening symptoms, chest pain, difficulty breathing, fevers, chills, weakness, dizziness, loss of consciousness, nausea, vomiting, weakness, severe headache or abdominal pain, or any other concerns.

## 2021-12-03 NOTE — ED CDU PROVIDER INITIAL DAY NOTE - PROGRESS NOTE DETAILS
Pt with persistent nausea with "spit up," despite Zofran and Reglan.  Patient states that she smokes marijuana daily. Concern at this time if symptoms could be 2/2 cannabinoid hyperemesis. Had discussion with patient and family member at bedside about trialing Haldol.  Agreeable. - Nani Shukla PA-C

## 2021-12-03 NOTE — ED PROVIDER NOTE - NS ED ROS FT
CONSTITUTIONAL: Negative for fever, chills, fatigue, recent weight changes  ENT/MOUTH: Negative for hearing difficulties, ear pain, sore throat, rhinorrhea  EYES: Negative for eye pain, vision changes  CARDIOVASCULAR: Negative for chest pain, palpitations, claudication, edema  RESPIRATORY: Negative for respiratory distress, cough, dyspnea  GASTROINTESTINAL: As per HPI  GENITOURINARY: Negative for dysuria, decreased urinary frequency, hematuria  MUSCULOSKELETAL: Negative for joint pain, back pain, arthralgias, myalgias  SKIN: Negative for skin lesions, rashes, hair changes  NEUROLOGICAL: Negative for headache, weakness, numbness, paresthesias  PSYCHIATRIC: Negative for depression, anxiety  HEME/LYMPH: Negative for bruising, easy bleeding, lymphadenopathy  ENDOCRINE: Negative for polyuria, polydipsia, temperature intolerance

## 2021-12-04 VITALS
OXYGEN SATURATION: 99 % | TEMPERATURE: 98 F | RESPIRATION RATE: 18 BRPM | DIASTOLIC BLOOD PRESSURE: 90 MMHG | HEART RATE: 90 BPM | SYSTOLIC BLOOD PRESSURE: 140 MMHG

## 2021-12-04 LAB
A1C WITH ESTIMATED AVERAGE GLUCOSE RESULT: 11.4 % — HIGH (ref 4–5.6)
A1C WITH ESTIMATED AVERAGE GLUCOSE RESULT: 11.5 % — HIGH (ref 4–5.6)
ALBUMIN SERPL ELPH-MCNC: 3.4 G/DL — SIGNIFICANT CHANGE UP (ref 3.3–5)
ALP SERPL-CCNC: 76 U/L — SIGNIFICANT CHANGE UP (ref 40–120)
ALT FLD-CCNC: 11 U/L — SIGNIFICANT CHANGE UP (ref 10–45)
ANION GAP SERPL CALC-SCNC: 12 MMOL/L — SIGNIFICANT CHANGE UP (ref 5–17)
AST SERPL-CCNC: 11 U/L — SIGNIFICANT CHANGE UP (ref 10–40)
B-OH-BUTYR SERPL-SCNC: 0.5 MMOL/L — HIGH
B-OH-BUTYR SERPL-SCNC: 1 MMOL/L — HIGH
BASE EXCESS BLDV CALC-SCNC: 3.9 MMOL/L — HIGH (ref -2–2)
BASOPHILS # BLD AUTO: 0.01 K/UL — SIGNIFICANT CHANGE UP (ref 0–0.2)
BASOPHILS NFR BLD AUTO: 0.1 % — SIGNIFICANT CHANGE UP (ref 0–2)
BILIRUB SERPL-MCNC: 0.5 MG/DL — SIGNIFICANT CHANGE UP (ref 0.2–1.2)
BUN SERPL-MCNC: 14 MG/DL — SIGNIFICANT CHANGE UP (ref 7–23)
CA-I SERPL-SCNC: 1.17 MMOL/L — SIGNIFICANT CHANGE UP (ref 1.15–1.33)
CALCIUM SERPL-MCNC: 8.6 MG/DL — SIGNIFICANT CHANGE UP (ref 8.4–10.5)
CHLORIDE BLDV-SCNC: 102 MMOL/L — SIGNIFICANT CHANGE UP (ref 96–108)
CHLORIDE SERPL-SCNC: 101 MMOL/L — SIGNIFICANT CHANGE UP (ref 96–108)
CO2 BLDV-SCNC: 31 MMOL/L — HIGH (ref 22–26)
CO2 SERPL-SCNC: 25 MMOL/L — SIGNIFICANT CHANGE UP (ref 22–31)
CREAT SERPL-MCNC: 0.69 MG/DL — SIGNIFICANT CHANGE UP (ref 0.5–1.3)
EOSINOPHIL # BLD AUTO: 0.01 K/UL — SIGNIFICANT CHANGE UP (ref 0–0.5)
EOSINOPHIL NFR BLD AUTO: 0.1 % — SIGNIFICANT CHANGE UP (ref 0–6)
ESTIMATED AVERAGE GLUCOSE: 280 MG/DL — HIGH (ref 68–114)
ESTIMATED AVERAGE GLUCOSE: 283 MG/DL — HIGH (ref 68–114)
GAS PNL BLDV: 137 MMOL/L — SIGNIFICANT CHANGE UP (ref 136–145)
GAS PNL BLDV: SIGNIFICANT CHANGE UP
GLUCOSE BLDC GLUCOMTR-MCNC: 156 MG/DL — HIGH (ref 70–99)
GLUCOSE BLDC GLUCOMTR-MCNC: 97 MG/DL — SIGNIFICANT CHANGE UP (ref 70–99)
GLUCOSE BLDV-MCNC: 156 MG/DL — HIGH (ref 70–99)
GLUCOSE SERPL-MCNC: 164 MG/DL — HIGH (ref 70–99)
HCO3 BLDV-SCNC: 30 MMOL/L — HIGH (ref 22–29)
HCT VFR BLD CALC: 35.3 % — SIGNIFICANT CHANGE UP (ref 34.5–45)
HCT VFR BLDA CALC: 34 % — LOW (ref 34.5–46.5)
HGB BLD CALC-MCNC: 11.3 G/DL — LOW (ref 11.7–16.1)
HGB BLD-MCNC: 11.5 G/DL — SIGNIFICANT CHANGE UP (ref 11.5–15.5)
IMM GRANULOCYTES NFR BLD AUTO: 0.4 % — SIGNIFICANT CHANGE UP (ref 0–1.5)
LACTATE BLDV-MCNC: 1 MMOL/L — SIGNIFICANT CHANGE UP (ref 0.7–2)
LYMPHOCYTES # BLD AUTO: 2.51 K/UL — SIGNIFICANT CHANGE UP (ref 1–3.3)
LYMPHOCYTES # BLD AUTO: 23.4 % — SIGNIFICANT CHANGE UP (ref 13–44)
MCHC RBC-ENTMCNC: 28.2 PG — SIGNIFICANT CHANGE UP (ref 27–34)
MCHC RBC-ENTMCNC: 32.6 GM/DL — SIGNIFICANT CHANGE UP (ref 32–36)
MCV RBC AUTO: 86.5 FL — SIGNIFICANT CHANGE UP (ref 80–100)
MONOCYTES # BLD AUTO: 0.83 K/UL — SIGNIFICANT CHANGE UP (ref 0–0.9)
MONOCYTES NFR BLD AUTO: 7.7 % — SIGNIFICANT CHANGE UP (ref 2–14)
NEUTROPHILS # BLD AUTO: 7.32 K/UL — SIGNIFICANT CHANGE UP (ref 1.8–7.4)
NEUTROPHILS NFR BLD AUTO: 68.3 % — SIGNIFICANT CHANGE UP (ref 43–77)
NRBC # BLD: 0 /100 WBCS — SIGNIFICANT CHANGE UP (ref 0–0)
PCO2 BLDV: 49 MMHG — HIGH (ref 39–42)
PH BLDV: 7.39 — SIGNIFICANT CHANGE UP (ref 7.32–7.43)
PLATELET # BLD AUTO: 267 K/UL — SIGNIFICANT CHANGE UP (ref 150–400)
PO2 BLDV: 38 MMHG — SIGNIFICANT CHANGE UP (ref 25–45)
POTASSIUM BLDV-SCNC: 3.4 MMOL/L — LOW (ref 3.5–5.1)
POTASSIUM SERPL-MCNC: 3.5 MMOL/L — SIGNIFICANT CHANGE UP (ref 3.5–5.3)
POTASSIUM SERPL-SCNC: 3.5 MMOL/L — SIGNIFICANT CHANGE UP (ref 3.5–5.3)
PROT SERPL-MCNC: 6.9 G/DL — SIGNIFICANT CHANGE UP (ref 6–8.3)
RBC # BLD: 4.08 M/UL — SIGNIFICANT CHANGE UP (ref 3.8–5.2)
RBC # FLD: 11.2 % — SIGNIFICANT CHANGE UP (ref 10.3–14.5)
SAO2 % BLDV: 68.7 % — SIGNIFICANT CHANGE UP (ref 67–88)
SODIUM SERPL-SCNC: 138 MMOL/L — SIGNIFICANT CHANGE UP (ref 135–145)
WBC # BLD: 10.72 K/UL — HIGH (ref 3.8–10.5)
WBC # FLD AUTO: 10.72 K/UL — HIGH (ref 3.8–10.5)

## 2021-12-04 PROCEDURE — 84100 ASSAY OF PHOSPHORUS: CPT

## 2021-12-04 PROCEDURE — 85025 COMPLETE CBC W/AUTO DIFF WBC: CPT

## 2021-12-04 PROCEDURE — 96376 TX/PRO/DX INJ SAME DRUG ADON: CPT

## 2021-12-04 PROCEDURE — 93005 ELECTROCARDIOGRAM TRACING: CPT

## 2021-12-04 PROCEDURE — 99284 EMERGENCY DEPT VISIT MOD MDM: CPT | Mod: 25

## 2021-12-04 PROCEDURE — 87186 SC STD MICRODIL/AGAR DIL: CPT

## 2021-12-04 PROCEDURE — 83036 HEMOGLOBIN GLYCOSYLATED A1C: CPT

## 2021-12-04 PROCEDURE — 82010 KETONE BODYS QUAN: CPT

## 2021-12-04 PROCEDURE — 82962 GLUCOSE BLOOD TEST: CPT

## 2021-12-04 PROCEDURE — G0378: CPT

## 2021-12-04 PROCEDURE — 36415 COLL VENOUS BLD VENIPUNCTURE: CPT

## 2021-12-04 PROCEDURE — 84295 ASSAY OF SERUM SODIUM: CPT

## 2021-12-04 PROCEDURE — 82435 ASSAY OF BLOOD CHLORIDE: CPT

## 2021-12-04 PROCEDURE — 80048 BASIC METABOLIC PNL TOTAL CA: CPT

## 2021-12-04 PROCEDURE — 85018 HEMOGLOBIN: CPT

## 2021-12-04 PROCEDURE — 83605 ASSAY OF LACTIC ACID: CPT

## 2021-12-04 PROCEDURE — 82947 ASSAY GLUCOSE BLOOD QUANT: CPT

## 2021-12-04 PROCEDURE — 87086 URINE CULTURE/COLONY COUNT: CPT

## 2021-12-04 PROCEDURE — 80053 COMPREHEN METABOLIC PANEL: CPT

## 2021-12-04 PROCEDURE — 84132 ASSAY OF SERUM POTASSIUM: CPT

## 2021-12-04 PROCEDURE — 84702 CHORIONIC GONADOTROPIN TEST: CPT

## 2021-12-04 PROCEDURE — 82330 ASSAY OF CALCIUM: CPT

## 2021-12-04 PROCEDURE — 96375 TX/PRO/DX INJ NEW DRUG ADDON: CPT

## 2021-12-04 PROCEDURE — 85014 HEMATOCRIT: CPT

## 2021-12-04 PROCEDURE — U0003: CPT

## 2021-12-04 PROCEDURE — 82565 ASSAY OF CREATININE: CPT

## 2021-12-04 PROCEDURE — 96374 THER/PROPH/DIAG INJ IV PUSH: CPT

## 2021-12-04 PROCEDURE — 83735 ASSAY OF MAGNESIUM: CPT

## 2021-12-04 PROCEDURE — 82803 BLOOD GASES ANY COMBINATION: CPT

## 2021-12-04 PROCEDURE — U0005: CPT

## 2021-12-04 PROCEDURE — 81001 URINALYSIS AUTO W/SCOPE: CPT

## 2021-12-04 PROCEDURE — 99217: CPT

## 2021-12-04 RX ORDER — DULAGLUTIDE 4.5 MG/.5ML
0.5 INJECTION, SOLUTION SUBCUTANEOUS
Qty: 1.5 | Refills: 0
Start: 2021-12-04 | End: 2021-12-24

## 2021-12-04 RX ORDER — FAMOTIDINE 10 MG/ML
20 INJECTION INTRAVENOUS ONCE
Refills: 0 | Status: COMPLETED | OUTPATIENT
Start: 2021-12-04 | End: 2021-12-04

## 2021-12-04 RX ORDER — ENOXAPARIN SODIUM 100 MG/ML
22 INJECTION SUBCUTANEOUS
Qty: 4.5 | Refills: 0
Start: 2021-12-04 | End: 2021-12-23

## 2021-12-04 RX ADMIN — SODIUM CHLORIDE 125 MILLILITER(S): 9 INJECTION INTRAMUSCULAR; INTRAVENOUS; SUBCUTANEOUS at 10:48

## 2021-12-04 RX ADMIN — FAMOTIDINE 20 MILLIGRAM(S): 10 INJECTION INTRAVENOUS at 15:01

## 2021-12-04 RX ADMIN — ONDANSETRON 4 MILLIGRAM(S): 8 TABLET, FILM COATED ORAL at 09:31

## 2021-12-04 RX ADMIN — Medication 2: at 08:55

## 2021-12-04 RX ADMIN — Medication 7 UNIT(S): at 08:54

## 2021-12-04 NOTE — ED ADULT NURSE REASSESSMENT NOTE - NS ED NURSE REASSESS COMMENT FT1
07.00 Am Received the Pt from  USMAN Buenrostro . Pt is Observed for hyperglycemia with vomiting not complaint with  DM medication  Received the Pt A&OX 4 obeys commands Tami N/V/D fever chills cp SOB   Comfort care & safety measures continued  IV site looks clean & dry no signs of infiltration noted pt denies  pain IV site .  Pt is advised to call for help  call bell with in the reach pt verbalized the understanding .  pending CDU  MD merchant . GCS 15/15 A&OX 4 PERRLA  size 3 Strong upper & lower extremities steady gait   No facial droop  No Hand Leg drop denies numbness tingling Continue to monitor  09.30 Po challenges started  Pt feels nauseous medicated as per order
17.00Pt is evaluated by CDU MD Di Leblanc. pt is feeling better.  Pt is discharged . Ml out  LASHAY Lloyd  explained the follow up care & gave the discharge summary  . Pt has stable vitals steady gait A&OX 4 at the time of Discharge
Received from [facility] area at 1600, awake, alert and orientedx4. Denies abdominal pain. Nauseous and vomited once. Zofran 4 mg IVP given. Will monitor effect.
Pt received from USMAN Elizabeth. Pt oriented to CDU & plan of care was discussed. Pt endorses N/V and states it is unchanged from previously. Medicated as per MAR. Pt states she was able to tolerate liquids but feels nauseous with little help from meds. Safety & comfort measures maintained. Call bell in reach. Will continue to monitor.

## 2021-12-04 NOTE — ED CDU PROVIDER SUBSEQUENT DAY NOTE - NS ED ROS FT
Constitutional: No fever or chills  Eyes: No visual changes, eye pain   CV: No chest pain or lower extremity edema  Resp: No SOB no cough  GI: No abd pain. + nausea + vomiting. + diarrhea.   : No dysuria, hematuria.   MSK: No musculoskeletal pain  Skin: No rash  Psych: No complaints   Neuro: No headache. No numbness or tingling. No weakness.  Endo: +DM

## 2021-12-04 NOTE — ED CDU PROVIDER SUBSEQUENT DAY NOTE - PROGRESS NOTE DETAILS
Pt seen at bedside. Pt resting, reports having eaten crackers and applesauce for lunch, would like longer to see how she feels after eating. VS stable from last reading. Pt has no complaints at this time. Will continue to monitor. Pt reports slight improvement in sxs, has tolerated PO, but reports feeling generally weak and would like to have more PO before going home. Pt also notes "upset stomach" will give pepcid. abdomen soft NT, no CVAT. Pt provided with warm compress. Pt seen at bedside. Pt in NAD. VS stable from last reading. Pt notes nausea, was able to tolerate a small amount of food for breakfast but is having nausea after eating and would like zofran. On exam pt with flat affect, soft NT abdomen. discussed pt marijuana use and need to reduce marijuana consumption wit goal of abstinence as this can contribute to current sxs. Pt report she believes sxs due to food poisoning as she had suspect pulled pork and mac and cheese, had vomiting and diarrhea which resolved prior to presentation. pt reports DM medication non-compliance due to grief from her recently  mother, pt reports she is going to psychotherapy currently, not endorsing SI, and does not wish to speak to social work in the ED. Explained importance of medication compliance for overall health. all questions answered and concerns addressed. Pt feels improved, tolerating PO in CDU, would like to be DC'ed. labs and plan d/w pt and pt family at bedside. all questions answered. d/w endocrinology on-call who reports pt should be DC'ed on 22U lantus QHS and 0.75mg trulicity 1/wk. Pt ready and stable for DC.

## 2021-12-04 NOTE — CHART NOTE - NSCHARTNOTEFT_GEN_A_CORE
Endo Chart note  32 y/o F w/ uncontrolled Type 2 DM complicated by neuropathy with hyperglycemia with nonadherence to insulin admitted with nausea and vomiting (high risk patient with severe hyperglycemia with glucose values > 300's    #Uncontrolled T2DM  A1c 11.5  D/c recs: Pt. already with nonadherence to medications so would try to simplify regimen as much as possible to help with adherence. Pt. tried metformin and Januvia with GI intolerance so will avoid. Does not report any issues with Trulicity in the past and she could benefit from the additional weight loss benefits.  increase lantus to 22 units (full weight based dose) QHS  trulicity 0.75 mg sc weekly (can do ozempic 0.25 mg sc weekly if not covered)    Outpt. endo follow-up  Outpt. optho, podiatry, micro/cr    pt can arrange appt at our office if she wishes    [facility]:  [address] [place] [address]. [address]. [place] [address]. Tel: [phone]    [facility]  [address], [address].     discussed with primary team    Mayela Wilkins MD  Attending Physician   Department of Endocrinology, Diabetes and Metabolism   Pager: [phone]    If before 9AM or after 5PM, or on weekends/holidays, please call the Endocrine answering service for assistance ([phone]).  For nonurgent matters, please email [email or web address] for assistance.

## 2021-12-04 NOTE — ED CDU PROVIDER SUBSEQUENT DAY NOTE - NS ED MD PROGRESS NOTE ADD
Staff he needs a OV Thursday for scott removal.  Please call him tomorrow to arrange.   Thanks Add Progress Note...

## 2021-12-04 NOTE — ED CDU PROVIDER SUBSEQUENT DAY NOTE - HISTORY
No interval changes since initial CDU provider note. Pt reports improvement in nausea. NAD VSS. Plan to PO challenge this morning in the setting of N/V with uncontrolled diabetes.- LASHAY Shukla

## 2021-12-04 NOTE — ED CDU PROVIDER SUBSEQUENT DAY NOTE - NSICDXFAMILYHX_GEN_ALL_CORE_FT
FAMILY HISTORY:  No pertinent family history in first degree relatives FAMILY HISTORY:  Mother  Still living? Unknown  FH: diabetes mellitus, Age at diagnosis: Age Unknown

## 2021-12-06 RX ORDER — CEFPODOXIME PROXETIL 100 MG
1 TABLET ORAL
Qty: 20 | Refills: 0
Start: 2021-12-06 | End: 2021-12-15

## 2021-12-06 NOTE — ED POST DISCHARGE NOTE - ADDITIONAL DOCUMENTATION
12/8/21: Cefpodoxime sensitive on final sensitivities. Appropriate care received. - Driss White PA-C

## 2021-12-06 NOTE — ED POST DISCHARGE NOTE - DETAILS
12/6: patient seen for n/v and poorly controlled sugars, patient met sepsis criteria so will treat as complicated. spoke with patient and started on cefpodoxime pending final sensitivities - Nereyda Kirkland PA-C 12/6: patient seen for n/v and poorly controlled sugars, patient met sepsis criteria based on lactate and HR so will treat as complicated. spoke with patient and started on cefpodoxime pending final sensitivities - Nereyda Kirkland PA-C

## 2022-03-24 RX ORDER — INSULIN GLARGINE 100 [IU]/ML
20 INJECTION, SOLUTION SUBCUTANEOUS
Qty: 0 | Refills: 0 | DISCHARGE

## 2022-04-03 ENCOUNTER — EMERGENCY (EMERGENCY)
Facility: HOSPITAL | Age: 32
LOS: 1 days | Discharge: HOME | End: 2022-04-03
Attending: EMERGENCY MEDICINE
Payer: COMMERCIAL

## 2022-04-03 VITALS
SYSTOLIC BLOOD PRESSURE: 150 MMHG | OXYGEN SATURATION: 99 % | RESPIRATION RATE: 20 BRPM | HEART RATE: 90 BPM | TEMPERATURE: 99 F | HEIGHT: 65 IN | WEIGHT: 250 LBS | DIASTOLIC BLOOD PRESSURE: 95 MMHG

## 2022-04-03 VITALS
RESPIRATION RATE: 18 BRPM | OXYGEN SATURATION: 97 % | DIASTOLIC BLOOD PRESSURE: 85 MMHG | SYSTOLIC BLOOD PRESSURE: 158 MMHG | TEMPERATURE: 98 F | HEART RATE: 89 BPM

## 2022-04-03 PROBLEM — E11.9 TYPE 2 DIABETES MELLITUS WITHOUT COMPLICATIONS: Chronic | Status: ACTIVE | Noted: 2021-12-03

## 2022-04-03 LAB
ALBUMIN SERPL ELPH-MCNC: 4.3 G/DL — SIGNIFICANT CHANGE UP (ref 3.3–5)
ALP SERPL-CCNC: 91 U/L — SIGNIFICANT CHANGE UP (ref 40–120)
ALT FLD-CCNC: 11 U/L — SIGNIFICANT CHANGE UP (ref 10–45)
ANION GAP SERPL CALC-SCNC: 12 MMOL/L — SIGNIFICANT CHANGE UP (ref 5–17)
ANION GAP SERPL CALC-SCNC: 13 MMOL/L — SIGNIFICANT CHANGE UP (ref 5–17)
AST SERPL-CCNC: 12 U/L — SIGNIFICANT CHANGE UP (ref 10–40)
BASE EXCESS BLDV CALC-SCNC: 12.9 MMOL/L — HIGH (ref -2–2)
BASOPHILS # BLD AUTO: 0.01 K/UL — SIGNIFICANT CHANGE UP (ref 0–0.2)
BASOPHILS NFR BLD AUTO: 0.1 % — SIGNIFICANT CHANGE UP (ref 0–2)
BILIRUB SERPL-MCNC: 0.6 MG/DL — SIGNIFICANT CHANGE UP (ref 0.2–1.2)
BUN SERPL-MCNC: 14 MG/DL — SIGNIFICANT CHANGE UP (ref 7–23)
BUN SERPL-MCNC: 17 MG/DL — SIGNIFICANT CHANGE UP (ref 7–23)
CA-I SERPL-SCNC: 1.17 MMOL/L — SIGNIFICANT CHANGE UP (ref 1.15–1.33)
CALCIUM SERPL-MCNC: 8.5 MG/DL — SIGNIFICANT CHANGE UP (ref 8.4–10.5)
CALCIUM SERPL-MCNC: 9.5 MG/DL — SIGNIFICANT CHANGE UP (ref 8.4–10.5)
CHLORIDE BLDV-SCNC: 98 MMOL/L — SIGNIFICANT CHANGE UP (ref 96–108)
CHLORIDE SERPL-SCNC: 101 MMOL/L — SIGNIFICANT CHANGE UP (ref 96–108)
CHLORIDE SERPL-SCNC: 95 MMOL/L — LOW (ref 96–108)
CO2 BLDV-SCNC: 38 MMOL/L — HIGH (ref 22–26)
CO2 SERPL-SCNC: 27 MMOL/L — SIGNIFICANT CHANGE UP (ref 22–31)
CO2 SERPL-SCNC: 30 MMOL/L — SIGNIFICANT CHANGE UP (ref 22–31)
CREAT SERPL-MCNC: 0.71 MG/DL — SIGNIFICANT CHANGE UP (ref 0.5–1.3)
CREAT SERPL-MCNC: 0.8 MG/DL — SIGNIFICANT CHANGE UP (ref 0.5–1.3)
EGFR: 101 ML/MIN/1.73M2 — SIGNIFICANT CHANGE UP
EGFR: 117 ML/MIN/1.73M2 — SIGNIFICANT CHANGE UP
EOSINOPHIL # BLD AUTO: 0 K/UL — SIGNIFICANT CHANGE UP (ref 0–0.5)
EOSINOPHIL NFR BLD AUTO: 0 % — SIGNIFICANT CHANGE UP (ref 0–6)
GAS PNL BLDV: 135 MMOL/L — LOW (ref 136–145)
GAS PNL BLDV: SIGNIFICANT CHANGE UP
GLUCOSE BLDV-MCNC: 160 MG/DL — HIGH (ref 70–99)
GLUCOSE SERPL-MCNC: 113 MG/DL — HIGH (ref 70–99)
GLUCOSE SERPL-MCNC: 148 MG/DL — HIGH (ref 70–99)
HCG SERPL-ACNC: <2 MIU/ML — SIGNIFICANT CHANGE UP
HCO3 BLDV-SCNC: 37 MMOL/L — HIGH (ref 22–29)
HCT VFR BLD CALC: 38.6 % — SIGNIFICANT CHANGE UP (ref 34.5–45)
HCT VFR BLDA CALC: 41 % — SIGNIFICANT CHANGE UP (ref 34.5–46.5)
HGB BLD CALC-MCNC: 13.7 G/DL — SIGNIFICANT CHANGE UP (ref 11.7–16.1)
HGB BLD-MCNC: 12.7 G/DL — SIGNIFICANT CHANGE UP (ref 11.5–15.5)
HIV 1 & 2 AB SERPL IA.RAPID: SIGNIFICANT CHANGE UP
IMM GRANULOCYTES NFR BLD AUTO: 0.4 % — SIGNIFICANT CHANGE UP (ref 0–1.5)
LACTATE BLDV-MCNC: 1.8 MMOL/L — SIGNIFICANT CHANGE UP (ref 0.7–2)
LIDOCAIN IGE QN: 20 U/L — SIGNIFICANT CHANGE UP (ref 7–60)
LYMPHOCYTES # BLD AUTO: 17.2 % — SIGNIFICANT CHANGE UP (ref 13–44)
LYMPHOCYTES # BLD AUTO: 2.15 K/UL — SIGNIFICANT CHANGE UP (ref 1–3.3)
MCHC RBC-ENTMCNC: 28.2 PG — SIGNIFICANT CHANGE UP (ref 27–34)
MCHC RBC-ENTMCNC: 32.9 GM/DL — SIGNIFICANT CHANGE UP (ref 32–36)
MCV RBC AUTO: 85.6 FL — SIGNIFICANT CHANGE UP (ref 80–100)
MONOCYTES # BLD AUTO: 0.83 K/UL — SIGNIFICANT CHANGE UP (ref 0–0.9)
MONOCYTES NFR BLD AUTO: 6.6 % — SIGNIFICANT CHANGE UP (ref 2–14)
NEUTROPHILS # BLD AUTO: 9.47 K/UL — HIGH (ref 1.8–7.4)
NEUTROPHILS NFR BLD AUTO: 75.7 % — SIGNIFICANT CHANGE UP (ref 43–77)
NRBC # BLD: 0 /100 WBCS — SIGNIFICANT CHANGE UP (ref 0–0)
PCO2 BLDV: 42 MMHG — SIGNIFICANT CHANGE UP (ref 39–42)
PH BLDV: 7.55 — HIGH (ref 7.32–7.43)
PLATELET # BLD AUTO: 333 K/UL — SIGNIFICANT CHANGE UP (ref 150–400)
PO2 BLDV: 25 MMHG — SIGNIFICANT CHANGE UP (ref 25–45)
POTASSIUM BLDV-SCNC: 3 MMOL/L — LOW (ref 3.5–5.1)
POTASSIUM SERPL-MCNC: 3 MMOL/L — LOW (ref 3.5–5.3)
POTASSIUM SERPL-MCNC: 3.5 MMOL/L — SIGNIFICANT CHANGE UP (ref 3.5–5.3)
POTASSIUM SERPL-SCNC: 3 MMOL/L — LOW (ref 3.5–5.3)
POTASSIUM SERPL-SCNC: 3.5 MMOL/L — SIGNIFICANT CHANGE UP (ref 3.5–5.3)
PROT SERPL-MCNC: 8.8 G/DL — HIGH (ref 6–8.3)
RBC # BLD: 4.51 M/UL — SIGNIFICANT CHANGE UP (ref 3.8–5.2)
RBC # FLD: 11.8 % — SIGNIFICANT CHANGE UP (ref 10.3–14.5)
SAO2 % BLDV: 46.2 % — LOW (ref 67–88)
SODIUM SERPL-SCNC: 138 MMOL/L — SIGNIFICANT CHANGE UP (ref 135–145)
SODIUM SERPL-SCNC: 140 MMOL/L — SIGNIFICANT CHANGE UP (ref 135–145)
WBC # BLD: 12.51 K/UL — HIGH (ref 3.8–10.5)
WBC # FLD AUTO: 12.51 K/UL — HIGH (ref 3.8–10.5)

## 2022-04-03 PROCEDURE — 85014 HEMATOCRIT: CPT

## 2022-04-03 PROCEDURE — 96361 HYDRATE IV INFUSION ADD-ON: CPT

## 2022-04-03 PROCEDURE — 84132 ASSAY OF SERUM POTASSIUM: CPT

## 2022-04-03 PROCEDURE — 96376 TX/PRO/DX INJ SAME DRUG ADON: CPT

## 2022-04-03 PROCEDURE — 83605 ASSAY OF LACTIC ACID: CPT

## 2022-04-03 PROCEDURE — 84295 ASSAY OF SERUM SODIUM: CPT

## 2022-04-03 PROCEDURE — 82803 BLOOD GASES ANY COMBINATION: CPT

## 2022-04-03 PROCEDURE — 99285 EMERGENCY DEPT VISIT HI MDM: CPT

## 2022-04-03 PROCEDURE — 93010 ELECTROCARDIOGRAM REPORT: CPT

## 2022-04-03 PROCEDURE — 86703 HIV-1/HIV-2 1 RESULT ANTBDY: CPT

## 2022-04-03 PROCEDURE — 99284 EMERGENCY DEPT VISIT MOD MDM: CPT | Mod: 25

## 2022-04-03 PROCEDURE — 82330 ASSAY OF CALCIUM: CPT

## 2022-04-03 PROCEDURE — 93005 ELECTROCARDIOGRAM TRACING: CPT

## 2022-04-03 PROCEDURE — 83690 ASSAY OF LIPASE: CPT

## 2022-04-03 PROCEDURE — 80053 COMPREHEN METABOLIC PANEL: CPT

## 2022-04-03 PROCEDURE — 84702 CHORIONIC GONADOTROPIN TEST: CPT

## 2022-04-03 PROCEDURE — 85018 HEMOGLOBIN: CPT

## 2022-04-03 PROCEDURE — 82435 ASSAY OF BLOOD CHLORIDE: CPT

## 2022-04-03 PROCEDURE — 96365 THER/PROPH/DIAG IV INF INIT: CPT

## 2022-04-03 PROCEDURE — 96375 TX/PRO/DX INJ NEW DRUG ADDON: CPT

## 2022-04-03 PROCEDURE — 85025 COMPLETE CBC W/AUTO DIFF WBC: CPT

## 2022-04-03 PROCEDURE — 36415 COLL VENOUS BLD VENIPUNCTURE: CPT

## 2022-04-03 PROCEDURE — 80048 BASIC METABOLIC PNL TOTAL CA: CPT

## 2022-04-03 PROCEDURE — 82010 KETONE BODYS QUAN: CPT

## 2022-04-03 PROCEDURE — 82947 ASSAY GLUCOSE BLOOD QUANT: CPT

## 2022-04-03 RX ORDER — FAMOTIDINE 10 MG/ML
20 INJECTION INTRAVENOUS ONCE
Refills: 0 | Status: COMPLETED | OUTPATIENT
Start: 2022-04-03 | End: 2022-04-03

## 2022-04-03 RX ORDER — SODIUM CHLORIDE 9 MG/ML
1000 INJECTION, SOLUTION INTRAVENOUS ONCE
Refills: 0 | Status: DISCONTINUED | OUTPATIENT
Start: 2022-04-03 | End: 2022-04-03

## 2022-04-03 RX ORDER — SODIUM CHLORIDE 9 MG/ML
1000 INJECTION INTRAMUSCULAR; INTRAVENOUS; SUBCUTANEOUS ONCE
Refills: 0 | Status: COMPLETED | OUTPATIENT
Start: 2022-04-03 | End: 2022-04-03

## 2022-04-03 RX ORDER — ONDANSETRON 8 MG/1
4 TABLET, FILM COATED ORAL ONCE
Refills: 0 | Status: COMPLETED | OUTPATIENT
Start: 2022-04-03 | End: 2022-04-03

## 2022-04-03 RX ORDER — ONDANSETRON 8 MG/1
4 TABLET, FILM COATED ORAL ONCE
Refills: 0 | Status: DISCONTINUED | OUTPATIENT
Start: 2022-04-03 | End: 2022-04-03

## 2022-04-03 RX ORDER — SODIUM CHLORIDE 9 MG/ML
1000 INJECTION INTRAMUSCULAR; INTRAVENOUS; SUBCUTANEOUS
Refills: 0 | Status: DISCONTINUED | OUTPATIENT
Start: 2022-04-03 | End: 2022-04-03

## 2022-04-03 RX ORDER — HALOPERIDOL DECANOATE 100 MG/ML
5 INJECTION INTRAMUSCULAR ONCE
Refills: 0 | Status: DISCONTINUED | OUTPATIENT
Start: 2022-04-03 | End: 2022-04-03

## 2022-04-03 RX ORDER — POTASSIUM CHLORIDE 20 MEQ
10 PACKET (EA) ORAL
Refills: 0 | Status: COMPLETED | OUTPATIENT
Start: 2022-04-03 | End: 2022-04-03

## 2022-04-03 RX ADMIN — Medication 30 MILLILITER(S): at 18:49

## 2022-04-03 RX ADMIN — SODIUM CHLORIDE 1000 MILLILITER(S): 9 INJECTION INTRAMUSCULAR; INTRAVENOUS; SUBCUTANEOUS at 12:00

## 2022-04-03 RX ADMIN — SODIUM CHLORIDE 100 MILLILITER(S): 9 INJECTION INTRAMUSCULAR; INTRAVENOUS; SUBCUTANEOUS at 13:21

## 2022-04-03 RX ADMIN — Medication 100 MILLIEQUIVALENT(S): at 13:22

## 2022-04-03 RX ADMIN — ONDANSETRON 4 MILLIGRAM(S): 8 TABLET, FILM COATED ORAL at 12:00

## 2022-04-03 RX ADMIN — Medication 10 MILLIEQUIVALENT(S): at 15:55

## 2022-04-03 RX ADMIN — SODIUM CHLORIDE 1000 MILLILITER(S): 9 INJECTION INTRAMUSCULAR; INTRAVENOUS; SUBCUTANEOUS at 15:28

## 2022-04-03 RX ADMIN — Medication 100 MILLIEQUIVALENT(S): at 14:17

## 2022-04-03 RX ADMIN — SODIUM CHLORIDE 1000 MILLILITER(S): 9 INJECTION INTRAMUSCULAR; INTRAVENOUS; SUBCUTANEOUS at 15:56

## 2022-04-03 RX ADMIN — FAMOTIDINE 20 MILLIGRAM(S): 10 INJECTION INTRAVENOUS at 12:00

## 2022-04-03 RX ADMIN — ONDANSETRON 4 MILLIGRAM(S): 8 TABLET, FILM COATED ORAL at 15:56

## 2022-04-03 RX ADMIN — Medication 100 MILLIEQUIVALENT(S): at 15:27

## 2022-04-03 RX ADMIN — SODIUM CHLORIDE 1000 MILLILITER(S): 9 INJECTION INTRAMUSCULAR; INTRAVENOUS; SUBCUTANEOUS at 15:55

## 2022-04-03 NOTE — ED PROVIDER NOTE - NS ED ROS FT
CONST: no fevers, no chills  EYES: no pain, no vision changes  ENT: no sore throat, no ear pain, no change in hearing  CV: no chest pain, no leg swelling  RESP: no shortness of breath, no cough  ABD: + abdominal pain, + nausea, +vomiting, no diarrhea  : no dysuria, no flank pain, no hematuria  MSK: no back pain, no extremity pain  NEURO: no headache or additional neurologic complaints  HEME: no easy bleeding  SKIN:  no rash

## 2022-04-03 NOTE — ED PROVIDER NOTE - OBJECTIVE STATEMENT
32 y/o F with hx of DM presenting to the ED with vomiting. She reports vomiting x 2 days associated with mild epigastric pain. She denies diarrhea. No dysuria or hematuria. No fever or chills. Reports she has been using her insulin consistently and has a glucose sensor, her glucose was 130s this morning. States she drank alcohol on Friday and that may have exacerbated her symptoms. She does use marijuana, has not stopped using recently. She has been able to tolerate small amounts of fluids.

## 2022-04-03 NOTE — ED PROVIDER NOTE - CLINICAL SUMMARY MEDICAL DECISION MAKING FREE TEXT BOX
30 y/o F with PMH DM presenting to the ED with vomiting. +known marijuana user. Vitals stable. Exam with mild epigastric tenderness. Concern for gastritis vs cyclic vomiting. Will check labs, treat symptomatically, +/- haldol if patient has continuous vomiting. Nicolás Irving, DO PGY3

## 2022-04-03 NOTE — ED PROVIDER NOTE - PATIENT PORTAL LINK FT
You can access the FollowMyHealth Patient Portal offered by [facility] by registering at the following website: [email or web address]. By joining [facility]’s FollowMyHealth portal, you will also be able to view your health information using other applications (apps) compatible with our system.

## 2022-04-03 NOTE — ED PROVIDER NOTE - PHYSICAL EXAMINATION
GENERAL: Awake, alert, NAD  HEENT: NC/AT, dry mucus membranes  LUNGS: CTAB, no wheezes or crackles   CARDIAC: RRR, no m/r/g  ABDOMEN: Soft, normal BS, mild epigastric tenderness, non distended, no rebound, no guarding  BACK:  no CVA tenderness  EXT: No edema, no calf tenderness, 2+ DP pulses bilaterally, no deformities.  NEURO: A&Ox3. Moving all extremities.  SKIN: Warm and dry. No rash.  PSYCH: Normal affect.

## 2022-04-03 NOTE — ED PROVIDER NOTE - NSFOLLOWUPINSTRUCTIONS_ED_ALL_ED_FT
Stop using marijuana as this is likely contributing to your symptoms.    Nausea is the feeling that you have to vomit. As nausea gets worse, it can lead to vomiting. Vomiting puts you at an increased risk for dehydration. Older adults and people with other diseases or a weak immune system are at higher risk for dehydration. Drink clear fluids in small but frequent amounts as tolerated. Eat bland, easy-to-digest foods in small amounts as tolerated.    SEEK IMMEDIATE MEDICAL CARE IF YOU HAVE ANY OF THE FOLLOWING SYMPTOMS: fever, inability to keep sufficient fluids down, black or bloody vomitus, black or bloody stools, lightheadedness/dizziness, chest pain, severe headache, rash, shortness of breath, cold or clammy skin, confusion, pain with urination, or any signs of dehydration.

## 2022-04-03 NOTE — ED PROVIDER NOTE - ATTENDING CONTRIBUTION TO CARE
Attending MD Hunter: I personally have seen and examined this patient.  Resident note reviewed and agree on plan of care and except where noted.  See below for details.     Seen in [place]    31F with PMH/PSH including DM presents to the ED with vomiting since Friday.  Reports associated mild epigastric pain.  Reports had EtOH on Friday.  Reports +THC.  Denies diarrhea, black or bloody stools.  Denies dysuria, hematuria, reports feels like she is urinating less and that urine is darker.  Reports is tolerating small sips of liquid.  Reports compliance with insulin, reports has glucose sensor, reports glucose in 130s this AM.  Denies chest pain, shortness of breath, fevers, recent illness.  A ten (10) point review of systems was negative other than as stated in the HPI or elsewhere in the chart.     Exam:   General: NAD  HENT: head NCAT, airway patent  Eyes: PERRL  Lungs: lungs CTAB with good inspiratory effort, no wheezing, no rhonchi, no rales  Cardiac: +S1S2, no m/r/g  GI: abdomen soft with +BS, mild epigastric tenderness, no rebound, no guarding, exam limited secondary to body habitus  : no CVAT  MSK: ranging neck and extremities freely  Neuro: moving all extremities spontaneously with 5/5 strength, sensory grossly intact, no gross neuro deficits  Psych: normal mood and affect     A/P: 31F with vomiting, epigastric pain,     TO BE COMPLETED

## 2022-04-03 NOTE — ED ADULT NURSE NOTE - OBJECTIVE STATEMENT
The patient is a 32y/o Female, A&Ox3, presents to ED from home, c/o 6/10 generalized abdominal pain & N/V x 2 days. Pt reports "throwing up bile" and associated weakness. Pt has a hx of DM, BGL currently well controlled (as per pt) by insulin pump, pt reports BGL in the 130's this AM. Pt denies any diarrhea, chest pain, SOB, fevers, and dysuria. Upon exam, pt notes tenderness to the upper left quadrant of the abdomen on palpation. Vitals stable, but BP elevated. Pt has friend at bedside, bed locked and in lowest position, resting comfortably. Will continue to monitor.

## 2022-04-03 NOTE — ED PROVIDER NOTE - PROGRESS NOTE DETAILS
Attending MD Hunter: Patient heaving, QTc checked, will give Zofran, Haldol, still needs repeat labs, will sign out to evening doctor, Dr. Gill. Attending MD Hunter: Reassessed, reports persistent nausea. Patient tolerating PO in the ED. Repeat labs WNL. K repleted. Nausea improved on exam. Will discharge home. Nicolás Irving, DO PGY3

## 2022-04-03 NOTE — ED PROVIDER NOTE - SHIFT CHANGE DETAILS
Attending MD Hunter: 31F with DM, THC use, persistent nausea, vomiting, Zofran, Haldol dose, completing K riders, IVFs, repeat labs, reassess

## 2023-08-02 NOTE — ED ADULT NURSE REASSESSMENT NOTE - SYMPTOMS
Patient called the office as she reached out to [facility] to check on her refill. They didn't receive the refill. Disp Refills Start End    Dulaglutide (TRULICITY) 3 JA/4.5OM Subcutaneous Solution Pen-injector 12 Pen 3 8/1/2023     Sig - Route: Inject 3 mg into the skin once a week.  - Subcutaneous    Sent to pharmacy as: Trulicity 3 QI/0.4DI Subcutaneous Solution Pen-injector (Dulaglutide)    Class: Fax    E-Prescribing Status: Transmission to pharmacy failed (8/1/2023 11:12 AM CDT)    [facility] faxed this order at 8/1/2023 11:12 [address] [facility], [address] [place] [address] AT [address] [place] [address], [phone], [phone] nausea